# Patient Record
Sex: FEMALE | Race: BLACK OR AFRICAN AMERICAN | Employment: OTHER | URBAN - METROPOLITAN AREA
[De-identification: names, ages, dates, MRNs, and addresses within clinical notes are randomized per-mention and may not be internally consistent; named-entity substitution may affect disease eponyms.]

---

## 2019-05-31 ENCOUNTER — ED HISTORICAL/CONVERTED ENCOUNTER (OUTPATIENT)
Dept: OTHER | Age: 64
End: 2019-05-31

## 2020-01-21 ENCOUNTER — OFFICE VISIT (OUTPATIENT)
Dept: FAMILY MEDICINE CLINIC | Age: 65
End: 2020-01-21

## 2020-01-21 VITALS
BODY MASS INDEX: 30.32 KG/M2 | TEMPERATURE: 98.1 F | RESPIRATION RATE: 16 BRPM | SYSTOLIC BLOOD PRESSURE: 130 MMHG | HEIGHT: 65 IN | HEART RATE: 79 BPM | OXYGEN SATURATION: 97 % | DIASTOLIC BLOOD PRESSURE: 80 MMHG | WEIGHT: 182 LBS

## 2020-01-21 DIAGNOSIS — Z76.89 ENCOUNTER TO ESTABLISH CARE WITH NEW DOCTOR: Primary | ICD-10-CM

## 2020-01-21 DIAGNOSIS — R10.811 RIGHT UPPER QUADRANT ABDOMINAL TENDERNESS WITHOUT REBOUND TENDERNESS: ICD-10-CM

## 2020-01-21 DIAGNOSIS — R05.3 CHRONIC COUGH: ICD-10-CM

## 2020-01-21 DIAGNOSIS — E89.0 S/P TOTAL THYROIDECTOMY: ICD-10-CM

## 2020-01-21 DIAGNOSIS — I10 BENIGN ESSENTIAL HYPERTENSION: ICD-10-CM

## 2020-01-21 DIAGNOSIS — Z11.59 NEED FOR HEPATITIS C SCREENING TEST: ICD-10-CM

## 2020-01-21 DIAGNOSIS — E89.0 POSTOPERATIVE HYPOTHYROIDISM: ICD-10-CM

## 2020-01-21 DIAGNOSIS — R07.89 RIGHT-SIDED CHEST WALL PAIN: ICD-10-CM

## 2020-01-21 DIAGNOSIS — Z12.31 ENCOUNTER FOR SCREENING MAMMOGRAM FOR MALIGNANT NEOPLASM OF BREAST: ICD-10-CM

## 2020-01-21 DIAGNOSIS — Z12.11 SCREEN FOR COLON CANCER: ICD-10-CM

## 2020-01-21 PROBLEM — H91.91 COMPLETE HEARING LOSS OF RIGHT EAR: Status: ACTIVE | Noted: 2020-01-21

## 2020-01-21 RX ORDER — BISMUTH SUBSALICYLATE 262 MG
1 TABLET,CHEWABLE ORAL DAILY
COMMUNITY
End: 2020-02-12

## 2020-01-21 RX ORDER — TRIAMTERENE AND HYDROCHLOROTHIAZIDE 37.5; 25 MG/1; MG/1
CAPSULE ORAL
COMMUNITY
End: 2020-01-21 | Stop reason: SDUPTHER

## 2020-01-21 RX ORDER — LEVOTHYROXINE SODIUM 88 UG/1
88 TABLET ORAL
COMMUNITY
Start: 2020-01-11 | End: 2020-03-11

## 2020-01-21 RX ORDER — KETOROLAC TROMETHAMINE 10 MG/1
TABLET, FILM COATED ORAL
COMMUNITY
End: 2020-01-21

## 2020-01-21 RX ORDER — KETOROLAC TROMETHAMINE 10 MG/1
TABLET, FILM COATED ORAL
COMMUNITY
Start: 2020-01-04 | End: 2020-01-21

## 2020-01-21 RX ORDER — TRIAMTERENE/HYDROCHLOROTHIAZID 37.5-25 MG
1 CAPSULE ORAL DAILY
COMMUNITY
Start: 2019-12-16 | End: 2020-03-11 | Stop reason: ALTCHOICE

## 2020-01-21 NOTE — PATIENT INSTRUCTIONS
Medicare Wellness Visit, Female The best way to live healthy is to have a lifestyle where you eat a well-balanced diet, exercise regularly, limit alcohol use, and quit all forms of tobacco/nicotine, if applicable. Regular preventive services are another way to keep healthy. Preventive services (vaccines, screening tests, monitoring & exams) can help personalize your care plan, which helps you manage your own care. Screening tests can find health problems at the earliest stages, when they are easiest to treat. [facility] follows the current, evidence-based guidelines published by the [facility] ([facility]) when recommending preventive services for our patients. Because we follow these guidelines, sometimes recommendations change over time as research supports it. (For example, mammograms used to be recommended annually. Even though Medicare will still pay for an annual mammogram, the newer guidelines recommend a mammogram every two years for women of average risk). Of course, you and your doctor may decide to screen more often for some diseases, based on your risk and your co-morbidities (chronic disease you are already diagnosed with). Preventive services for you include: - Medicare offers their members a free annual wellness visit, which is time for you and your primary care provider to discuss and plan for your preventive service needs. Take advantage of this benefit every year! 
-All adults over the age of 72 should receive the recommended pneumonia vaccines. Current USPSTF guidelines recommend a series of two vaccines for the best pneumonia protection.  
-All adults should have a flu vaccine yearly and a tetanus vaccine every 10 years.  
-All adults age 48 and older should receive the shingles vaccines (series of two vaccines). -All adults age 38-68 who are overweight should have a diabetes screening test once every three years. -All adults born between [birth year] and 1966 should be screened once for Hepatitis C. 
-Other screening tests and preventive services for persons with diabetes include: an eye exam to screen for diabetic retinopathy, a kidney function test, a foot exam, and stricter control over your cholesterol.  
-Cardiovascular screening for adults with routine risk involves an electrocardiogram (ECG) at intervals determined by your doctor.  
-Colorectal cancer screenings should be done for adults age 54-65 with no increased risk factors for colorectal cancer. There are a number of acceptable methods of screening for this type of cancer. Each test has its own benefits and drawbacks. Discuss with your doctor what is most appropriate for you during your annual wellness visit. The different tests include: colonoscopy (considered the best screening method), a fecal occult blood test, a fecal DNA test, and sigmoidoscopy. 
 
-A bone mass density test is recommended when a woman turns 65 to screen for osteoporosis. This test is only recommended one time, as a screening. Some providers will use this same test as a disease monitoring tool if you already have osteoporosis. -Breast cancer screenings are recommended every other year for women of normal risk, age 54-69. 
-Cervical cancer screenings for women over age 72 are only recommended with certain risk factors. Here is a list of your current Health Maintenance items (your personalized list of preventive services) with a due date:

## 2020-01-21 NOTE — PROGRESS NOTES
Chief Complaint   Patient presents with    New Patient     welcome to medicare is due   [address]

## 2020-01-21 NOTE — PROGRESS NOTES
Chief Complaint   Patient presents with    New Patient    Establish Care    Thyroid Problem     follow up    Cough     x 3 weeks, soreness in right upper chest       HISTORY OF PRESENT ILLNESS   HPI  70 yo new patient presents to establish care. 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 She previously lived& worked in [place] for several years, then moved to [address] [place] [address] for this new work assignment which is projected to take up to about 2-3 yrs. She lives less than 5 miles from our practice and has chosen to establish care here. She had a routine check up w/ a primary care practice at [facility] back in . What prompted that was because she has a h/o thyroid disorder and needed to have her rx renewed. She was seeing her providers in [place] on a routine basis for preventive care, hypertension and thyroid mgt. She was diagnosed w/ nodular cystic goiter back in 2013 w/ benign biopsies but ended up having total thyroidectomy in 2015 for enlarging goiter and obstructive sx. She has been on TRT since that time, previously managed by an Endocrinologist then transitioned to her PCP. At her checkup at [facility] in  her Synthroid dose was increased from 75 mcg to 88 mcg due to high TSH. When she went back for follow up check there last week she was told that her TSH was now too low and it was recommended that she alternate between the 75 mcg and 88 mcg dose taking every other day. But she doesn't want to do that and would prefer to start over and have it rechecked here today first instead. Clinically she is stable. She reports 3 week h/o persistent annoying dry cough. Feels like slight tickling. Cough is not deep or coarse. Cough is non productive. No chest congestion. No fevers, chills, sweats. No hoarseness. No dysphagia. Mild acid reflux but not significant enough for her to feel she has needed to take anything for it.   No wt loss.  Lifelong nonsmoker. She has also had a 3 week h/o right upper chest wall pain/soreness and soreness right anterior lower rib margin. No pleuritic pain, sob, wheezing or hemoptysis. The discomfort extends up under her right rib cage but she denies abdominal pain, N/V/D/C or change in bowel habits. She went to [facility] 1-4-2020 and a Urine (negative) and KUB were obtained which I have reviewed in system as follows:  Study Date: Jan 4, 2020 2:06:07 PM EST Accession: [id number] Description: Abdomen Ref Phys: Liana MILNER HISTORY: abdominal and lower back pain (Hx) Comparison: None available. KUB: Findings: There is a normal bowel gas pattern with no bowel dilatation or small bowel gas present. There is no definite pathologic calcification. There is a moderate degree of colonic stool density seen throughout the colon. There is no gross free intraperitoneal air. However, the sensitivity of this finding is limited on this single supine view. The osseous structures are normal. IMPRESSION: Non-obstructive bowel gas pattern. Moderate stool. Comment: if patient has laboratory values or physical exam findings  concerning for an acute abdomen, a dedicated CT of the abdomen/pelviswith contrast would be recommended. She has family h/o breast cancer as detailed below. She gets mammograms annually. Due screening at this time. No breast complaints. Denies breast pain. Does SBE's and denies lumps or masses or tenderness. She had a benign cyst removed from her right breast in the 1980's and denies abnormal mammograms. She has chronic hypertension diagnosed in her 29's and states she has been on the Dyazide ever since then w/ good control x several years. REVIEW OF SYMPTOMS   Review of Systems   Constitutional: Negative. Negative for chills, diaphoresis, fever, malaise/fatigue and weight loss.    Respiratory: Negative for hemoptysis, sputum production, shortness of breath and wheezing. Cardiovascular: Negative for palpitations and leg swelling. Gastrointestinal: Negative for nausea and vomiting. Genitourinary: Negative. Neurological: Negative. Endo/Heme/Allergies: Negative. Psychiatric/Behavioral: Negative. PROBLEM LIST/MEDICAL HISTORY     Problem List  Date Reviewed: 2020          Codes Class Noted    S/P total thyroidectomy ICD-10-CM: E89.0  ICD-9-CM: V45.89  2020    Overview Signed 2020 11:45 AM by Angelic Marina MD     Surgery 2015 for Multinodular Goiter, Cystic masses, Benign             Benign essential hypertension ICD-10-CM: I10  ICD-9-CM: 401.1  2020    Overview Signed 2020 11:47 AM by Angelic Marina MD     Diagnosed in her 30's             Complete hearing loss of right ear ICD-10-CM: H91.91  ICD-9-CM: 389.9  2020    Overview Signed 2020 11:51 AM by Angelic Marina MD     [id number], s/p trauma w/ resultant surgery ending in complete hearing loss             Postoperative hypothyroidism ICD-10-CM: E89.0  ICD-9-CM: 244.0  2020    Overview Signed 2020 12:21 PM by Angelic Marina MD     S/p total thyroidectomy 2015 for nodular/cystic goiter, benign                       PAST SURGICAL HISTORY     Past Surgical History:   Procedure Laterality Date    HX BREAST BIOPSY Right     Benign Cyst Removed    HX  SECTION  ,     HX COLONOSCOPY  2015    Normal done in [place], repeat in 5 yrs due to family hx    HX CYST REMOVAL      Skin on back of neck    HX HEENT      Right Ear Surgery, Traumatic Scars    HX PARTIAL HYSTERECTOMY  in her 42's    Uterine Fibroids, Left Ovarian Cysts-Left Oopherectomy    HX THYROIDECTOMY  2015    Cystic Goiter    IR FINE NEEDLE ASPIRATION THYROID  2013    Benign Cysts         MEDICATIONS     Current Outpatient Medications   Medication Sig    SYNTHROID 88 mcg tablet Take 88 mcg by mouth Daily (before breakfast).     DYAZIDE 37.5-25 mg per capsule Take 1 Cap by mouth daily.  multivitamin (ONE A DAY) tablet Take 1 Tab by mouth daily. No current facility-administered medications for this visit. ALLERGIES     Allergies   Allergen Reactions    Sulfa (Sulfonamide Antibiotics) Swelling     Facial swelling           SOCIAL HISTORY     Social History     Socioeconomic History    Marital status:      Spouse name: Not on file    Number of children: 2    Years of education: Not on file    Highest education level: Not on file   Occupational History    Occupation: Contractor/ for the [employer]   Tobacco Use    Smoking status: Never Smoker    Smokeless tobacco: Never Used   Substance and Sexual Activity    Alcohol use: Yes     Comment: occsaional glass of wine    Drug use: Never    Sexual activity: Yes   Social History Narrative    Native of [place] [place] [facility] where all of her family still resides. 1 son in [place] [facility], but 1 daughter in [address] [place] [address]. Patient is a software contractor, lived/worked in [place] for several years, moved to [address] [place] [address] for work related 2-3 yr project. Lives in [place] near [place].          IMMUNIZATIONS  Immunization History   Administered Date(s) Administered    Tdap 10/30/2019         FAMILY HISTORY     Family History   Problem Relation Age of Onset    Lung Cancer Mother          age 68, former smoker    Breast Cancer Mother         diagnosed w/ breast cancer in her late 50's/early 63's, surgery, survivor     Hypertension Mother     Prostate Cancer Father          in his early [de-identified] Hypertension Sister     Diabetes Sister     Breast Cancer Sister         diagnosed in her early 42's, treated w/ radiation and surgery    Prostate Cancer Brother         survivor    Colon Cancer Brother         diagnosed in his 52's    Asthma Son     No Known Problems Daughter     Hypertension Sister     High Cholesterol Sister  Hypertension Brother     Obesity Brother     No Known Problems Brother     Heart Disease Brother          VITALS     Visit Vitals  /80 (BP 1 Location: Left arm, BP Patient Position: Sitting)   Pulse 79   Temp 98.1 °F (36.7 °C) (Oral)   Resp 16   Ht 5' 5.35\" (1.66 m)   Wt 182 lb (82.6 kg)   SpO2 97%   BMI 29.96 kg/m²          PHYSICAL EXAMINATION   Physical Exam  Vitals signs reviewed. Constitutional:       General: She is not in acute distress. Appearance: Normal appearance. She is not ill-appearing. Comments: Healthy appearing AA female   HENT:      Mouth/Throat:      Mouth: Mucous membranes are moist.      Pharynx: Oropharynx is clear. No pharyngeal swelling, oropharyngeal exudate or uvula swelling. Neck:      Musculoskeletal: No muscular tenderness. Cardiovascular:      Rate and Rhythm: Normal rate and regular rhythm. Heart sounds: Normal heart sounds. No murmur. No gallop. Pulmonary:      Effort: Pulmonary effort is normal.      Breath sounds: Normal breath sounds. Chest:      Chest wall: Tenderness (reproducible tenderness right upper chest wall muscles and intercostal muscles; mild tenderness along right anterior lower rib margin) present. Abdominal:      General: Bowel sounds are normal.      Palpations: Abdomen is soft. There is no mass. Tenderness: There is no guarding or rebound. Negative signs include Holloway's sign. Comments: Mild tenderness directly behind right lower rib margin in RUQ. No palpable masses, no G/R. Musculoskeletal:         General: No swelling or tenderness. Right lower leg: No edema. Left lower leg: No edema. Skin:     General: Skin is warm and dry. Psychiatric:         Mood and Affect: Mood normal.              ASSESSMENT & PLAN       ICD-10-CM ICD-9-CM    1. Encounter to establish care with new doctor Z76.89 V65.8    2. Postoperative hypothyroidism E89.0 244.0 TSH 3RD GENERATION      T4, FREE      T3 TOTAL   3.  S/P total thyroidectomy E89.0 V45.89    4. Benign essential hypertension, controlled, stable on current regimen of Dyazide x several years I10 401.1 CBC W/O DIFF      METABOLIC PANEL, COMPREHENSIVE   5. Chronic cough R05 786.2 XR RIBS RT W PA CXR MIN 3 V   6. Right-sided chest wall pain R07.89 786.52 XR RIBS RT W PA CXR MIN 3 V   7. Right upper quadrant abdominal tenderness without rebound tenderness R10.811 789.61    8. Need for hepatitis C screening test Z11.59 V73.89 HEPATITIS C AB   9. Encounter for screening mammogram for malignant neoplasm of breast Z12.31 V76.12 RENÉE 3D ALETHA W MAMMO BI SCREENING INCL CAD   8. Screen for colon cancer Z12.11 V76.51 REFERRAL FOR COLONOSCOPY     72 yo new patient presents to establish care. 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 .  She lives less than 5 miles from our practice and has chosen to establish care here. Thyroid:  S/P total thyroidectomy 2015 for nodular/cystic goiter  She had a routine check up w/ a primary care practice at [facility] for the first time back in . At her checkup at [facility] in  her Synthroid dose was increased from 75 mcg to 88 mcg due to high TSH. When she went back for follow up check there last week she was told that her TSH was now too low and it was recommended that she alternate between the 75 mcg and 88 mcg dose taking every other day. But she doesn't want to do that and would prefer to start over and have it rechecked here today first instead. Clinically she is stable. I have no records available for review at this time. TFT's rechecked today. Cough/Chest:  Chronic dry cough x 3 weeks. Differential dx r/w pt including allergic, pnd, acid reflux, obstructive, other. Chest wall pain/tenderness and rib tenderness may be related to muscle strain from coughing, inflammation, costochondritis. Check rib films and chest xray.   Will consider trial of allergy/sinus pill and or PPI after review  She has mild RUQ tenderness on exam which may also be muscular related to the above. I noticed in EMR that she was seen at [facility] on 1-4-2020 w/ c/o abdominal pain although she denies abdominal pain at this time. Urine done there that day was negative. KUB showed large stool, otherwise negative. If workup today negative and she continues w/ the above complaints of right chest/rib tenderness, would consider RUQ US to rule out gallbladder colic as well. She is due mammogram screen at this time, so will go ahead and get that ordered now as well. Reviewed diet, nutrition, exercise, weight management, BMI/goals, age/risk based screening recommendations, health maintenance & prevention counseling. Cancer screening USPTFS guidelines reviewed w/ pt today. Discussed benefits/positive/negative outcomes of screening based on age/risk stratification. Informed consent for/against screening based on pt's personal hx/risk factors. Updated in history above and health maintenance. Mammogram ordered. Referred for colonoscopy. No longer gets paps due to hysterectomy years ago for benign disease.     Further follow up & other recommendations pending review of labs, xrays and mammogram. Otherwise plan on follow up in 4-6 weeks, sooner if anything worsens in the interim

## 2020-01-22 LAB
ALBUMIN SERPL-MCNC: 4.7 G/DL (ref 3.8–4.8)
ALBUMIN/GLOB SERPL: 1.6 {RATIO} (ref 1.2–2.2)
ALP SERPL-CCNC: 84 IU/L (ref 39–117)
ALT SERPL-CCNC: 17 IU/L (ref 0–32)
AST SERPL-CCNC: 15 IU/L (ref 0–40)
BILIRUB SERPL-MCNC: 0.3 MG/DL (ref 0–1.2)
BUN SERPL-MCNC: 15 MG/DL (ref 8–27)
BUN/CREAT SERPL: 16 (ref 12–28)
CALCIUM SERPL-MCNC: 10.4 MG/DL (ref 8.7–10.3)
CHLORIDE SERPL-SCNC: 98 MMOL/L (ref 96–106)
CO2 SERPL-SCNC: 26 MMOL/L (ref 20–29)
CREAT SERPL-MCNC: 0.95 MG/DL (ref 0.57–1)
ERYTHROCYTE [DISTWIDTH] IN BLOOD BY AUTOMATED COUNT: 14.1 % (ref 11.7–15.4)
GLOBULIN SER CALC-MCNC: 2.9 G/DL (ref 1.5–4.5)
GLUCOSE SERPL-MCNC: 88 MG/DL (ref 65–99)
HCT VFR BLD AUTO: 40.8 % (ref 34–46.6)
HCV AB S/CO SERPL IA: <0.1 S/CO RATIO (ref 0–0.9)
HGB BLD-MCNC: 13.2 G/DL (ref 11.1–15.9)
MCH RBC QN AUTO: 24.5 PG (ref 26.6–33)
MCHC RBC AUTO-ENTMCNC: 32.4 G/DL (ref 31.5–35.7)
MCV RBC AUTO: 76 FL (ref 79–97)
PLATELET # BLD AUTO: 432 X10E3/UL (ref 150–450)
POTASSIUM SERPL-SCNC: 3.9 MMOL/L (ref 3.5–5.2)
PROT SERPL-MCNC: 7.6 G/DL (ref 6–8.5)
RBC # BLD AUTO: 5.38 X10E6/UL (ref 3.77–5.28)
SODIUM SERPL-SCNC: 142 MMOL/L (ref 134–144)
T3 SERPL-MCNC: 95 NG/DL (ref 71–180)
T4 FREE SERPL-MCNC: 1.74 NG/DL (ref 0.82–1.77)
TSH SERPL DL<=0.005 MIU/L-ACNC: 0.02 UIU/ML (ref 0.45–4.5)
WBC # BLD AUTO: 9 X10E3/UL (ref 3.4–10.8)

## 2020-01-25 ENCOUNTER — TELEPHONE (OUTPATIENT)
Dept: FAMILY MEDICINE CLINIC | Age: 65
End: 2020-01-25

## 2020-01-25 DIAGNOSIS — E83.52 HYPERCALCEMIA: ICD-10-CM

## 2020-01-25 DIAGNOSIS — E89.0 POSTOPERATIVE HYPOTHYROIDISM: Primary | ICD-10-CM

## 2020-01-25 NOTE — TELEPHONE ENCOUNTER
Advise pt her kidney and liver tests were normal and her one time Hep C screen recommended for all baby boomers was negative. Glucose/blood sugar was normal, non diabetes range. Calcium level was mildly elevated. I dont have a baseline for her, so I am not sure if this is something she has a history of and if it has been worked up before. Sometimes it can be related to her BP medication. Ask her if she has h/o this. Taking any calcium or calcium containing supplements? Thyroid is on the OVER active side as suggested by her LOW TSH (it is the opposite concept). She told me that at her checkup w/ previous PCP at [address] [place] [address] in  her Synthroid dose was increased from 75 mcg to 88 mcg due to high TSH. When she went back for follow up check there about 2 weeks ago she was told that her TSH was now too low (like it is still now) and it was recommended that she alternate between the 75 mcg and 88 mcg dose taking every other day. But she didnt want to do that yet and preferred to wait to have it rechecked here first instead. So now that recommendation of lowering her dose is going to be the same. If she doesn't like the concept of having 2 different scripts and having to alternate back and forth, I can make another suggestion for reduced dosing for her to try instead. Let me know which she prefers and I will send recommendations for new regimen instead. She is already scheduled as noted below for follow up in March w/ me to follow up on multiple issues.       Future Appointments  Date Time Provider Ishan Aranda  3/3/2020  8:40 AM [facility]

## 2020-01-27 NOTE — TELEPHONE ENCOUNTER
Went over results with pt. She doesn't recall anyone telling her that her CA+ was elevated. She takes a multivitamin but no CA+ supplement. I told her to stop the multivit. Asked her about the thyroid dose. She said that she has been taking 88 mcg dose since Oct.  She said that the MD @ [facility] wanted her to alternate but she didn't want to do 2 pills. Told her I would get back with her    Alyssa Titus over new directions for the synthroid. She states understanding and repeated back to me. She will come the week before her appt to get labs done.

## 2020-01-30 NOTE — TELEPHONE ENCOUNTER
1) Stop the MVI    2) Since she wants to stick w/ one script instead of alternating between the two and given that she already has the 88 mcg dose, we will work w/ that. We have to find a regimen that will be more than 75 mcg per day and less than 88 mcg per day. So her new regimen will be:    Synthroid 88 mcg tabs:  -Take 1 & 1/2 tabs once a day on Mon-Thur  -Take 1/2 tab on Fri  -Skip on Sat & Sun    She is scheduled for follow up w/ me 3/3. Will recheck labs then but I went ahead and pre-ordered now.  She does not need to fast.

## 2020-02-03 ENCOUNTER — HOSPITAL ENCOUNTER (OUTPATIENT)
Dept: MAMMOGRAPHY | Age: 65
Discharge: HOME | End: 2020-02-03
Attending: FAMILY MEDICINE
Payer: MEDICARE

## 2020-02-03 ENCOUNTER — HOSPITAL ENCOUNTER (OUTPATIENT)
Dept: GENERAL RADIOLOGY | Age: 65
Discharge: HOME | End: 2020-02-03
Attending: FAMILY MEDICINE
Payer: MEDICARE

## 2020-02-03 DIAGNOSIS — Z12.31 ENCOUNTER FOR SCREENING MAMMOGRAM FOR MALIGNANT NEOPLASM OF BREAST: ICD-10-CM

## 2020-02-03 DIAGNOSIS — R07.89 RIGHT-SIDED CHEST WALL PAIN: ICD-10-CM

## 2020-02-03 DIAGNOSIS — R05.3 CHRONIC COUGH: ICD-10-CM

## 2020-02-03 PROCEDURE — 71101 X-RAY EXAM UNILAT RIBS/CHEST: CPT

## 2020-02-03 PROCEDURE — 77063 BREAST TOMOSYNTHESIS BI: CPT

## 2020-02-07 ENCOUNTER — TELEPHONE (OUTPATIENT)
Dept: FAMILY MEDICINE CLINIC | Age: 65
End: 2020-02-07

## 2020-02-07 NOTE — TELEPHONE ENCOUNTER
Advise pt rib and chest film shows small right pleural effusion (small fluid in base of lung) and small area of compressed lung (slight collapse). Could represent resolving or present infection or other cause. Rib was normal.  How is she doing? Symptoms better, worse, same or resolved?   Still waiting on her mammogram.

## 2020-02-10 NOTE — TELEPHONE ENCOUNTER
Pt said that she isn't doing any better. She is still in pain and still coughing.   I scheduled her for an appt for Wed 2/12

## 2020-02-12 ENCOUNTER — OFFICE VISIT (OUTPATIENT)
Dept: FAMILY MEDICINE CLINIC | Age: 65
End: 2020-02-12

## 2020-02-12 VITALS
OXYGEN SATURATION: 99 % | SYSTOLIC BLOOD PRESSURE: 138 MMHG | HEIGHT: 65 IN | WEIGHT: 183.4 LBS | TEMPERATURE: 98.4 F | DIASTOLIC BLOOD PRESSURE: 86 MMHG | HEART RATE: 75 BPM | BODY MASS INDEX: 30.56 KG/M2 | RESPIRATION RATE: 16 BRPM

## 2020-02-12 DIAGNOSIS — R05.8 ALLERGIC COUGH: ICD-10-CM

## 2020-02-12 DIAGNOSIS — R05.3 CHRONIC COUGH: Primary | ICD-10-CM

## 2020-02-12 DIAGNOSIS — E89.0 S/P TOTAL THYROIDECTOMY: ICD-10-CM

## 2020-02-12 DIAGNOSIS — R07.9 RIGHT-SIDED CHEST PAIN: ICD-10-CM

## 2020-02-12 DIAGNOSIS — R22.1 LOCALIZED SWELLING, MASS OR LUMP OF NECK: ICD-10-CM

## 2020-02-12 DIAGNOSIS — Z86.39 HISTORY OF MULTINODULAR GOITER: ICD-10-CM

## 2020-02-12 DIAGNOSIS — R10.811 RIGHT UPPER QUADRANT ABDOMINAL TENDERNESS WITHOUT REBOUND TENDERNESS: ICD-10-CM

## 2020-02-12 RX ORDER — CETIRIZINE HCL 10 MG
10 TABLET ORAL
COMMUNITY
Start: 2020-02-12 | End: 2020-03-11

## 2020-02-12 RX ORDER — BUDESONIDE AND FORMOTEROL FUMARATE DIHYDRATE 80; 4.5 UG/1; UG/1
2 AEROSOL RESPIRATORY (INHALATION) 2 TIMES DAILY
Qty: 1 INHALER | Refills: 0 | Status: SHIPPED | OUTPATIENT
Start: 2020-02-12 | End: 2020-02-24 | Stop reason: SINTOL

## 2020-02-12 NOTE — PROGRESS NOTES
Chief Complaint   Patient presents with    Cough     getting worse and is dry              1. Have you been to the ER, urgent care clinic since your last visit? Hospitalized since your last visit? No    2. Have you seen or consulted any other health care providers outside of the [address] [place] [address] since your last visit? Include any pap smears or colon screening.  No

## 2020-02-12 NOTE — PATIENT INSTRUCTIONS
Chronic Cough: Care Instructions Your Care Instructions A cough is your body's response to something that bothers your throat or airways. Many things can cause a cough. You might cough because of a cold or the flu, bronchitis, or asthma. Smoking, postnasal drip, allergies, and stomach acid that backs up into your throat also can cause a cough. A cough can be short-term (acute) or long-term (chronic). A chronic cough lasts more than 3 weeks. A chronic cough is often caused by a long-term problem, such as asthma. Another cause might be a medicine, such as an ACE inhibitor. A cough is a symptom, not a disease. To treat a chronic cough, you may need to treat the problem that causes it. You can take a few steps at home to cough less and feel better. Some people cough or clear their throat out of habit for no clear reason. Follow-up care is a key part of your treatment and safety. Be sure to make and go to all appointments, and call your doctor if you are having problems. It's also a good idea to know your test results and keep a list of the medicines you take. How can you care for yourself at home? · Drink plenty of water and other fluids. This may help soothe a dry or sore throat. Honey or lemon juice in hot water or tea may ease a dry cough. · Prop up your head on pillows to help you breathe and ease a cough. · Do not smoke or allow others to smoke around you. Smoke can make a cough worse. If you need help quitting, talk to your doctor about stop-smoking programs and medicines. These can increase your chances of quitting for good. · Avoid exposure to smoke, dust, or other pollutants, or wear a face mask. Check with your doctor or pharmacist to find out which type of face mask will give you the most benefit. · Take cough medicine as directed by your doctor. · Try cough drops to soothe a dry or sore throat. Cough drops don't stop a cough.  Medicine-flavored cough drops are no better than candy-flavored drops or hard candy. Throat clearing When you have a chronic cough or a disease that may cause this type of cough, you may often feel like you want to clear your throat. This helps bring up mucus. But throat clearing does not always have a cause. Throat clearing can become a habit. The more you do it, the more you feel like you need to do it. But frequent throat clearing can be hard on your vocal cords. It's like slamming them together. To help lessen throat clearing, you can try: · Taking small sips of water. · Not clearing your throat when you feel you need to. · Swallowing hard when you want to clear your throat. You may want to ask your doctor if a medicine that thins mucus would help. When should you call for help? Call 911 anytime you think you may need emergency care. For example, call if: 
  · You have severe trouble breathing.  
 Call your doctor now or seek immediate medical care if: 
  · You cough up blood.  
  · You have new or worse trouble breathing.  
  · You have a new or higher fever.  
 Watch closely for changes in your health, and be sure to contact your doctor if: 
  · You cough more deeply or more often, especially if you notice more mucus or a change in the color of your mucus.  
  · You do not get better as expected. Where can you learn more? Go to http://evette-diane.info/. Enter X144 in the search box to learn more about \"Chronic Cough: Care Instructions. \" Current as of: June 9, 2019 Content Version: 12.2 © 7724-5911 [employer]. Care instructions adapted under license by [employer] (which disclaims liability or warranty for this information). If you have questions about a medical condition or this instruction, always ask your healthcare professional. [address] any warranty or liability for your use of this information.

## 2020-02-18 ENCOUNTER — TELEPHONE (OUTPATIENT)
Dept: FAMILY MEDICINE CLINIC | Age: 65
End: 2020-02-18

## 2020-02-19 NOTE — TELEPHONE ENCOUNTER
Advise pt we did received updated report now on her mammogram which was negative/normal. Waiting on her neck/thyroid US now. Order is in system but doesn't look like it is scheduled yet. Give her central scheduling number and advise her to call to set that up.

## 2020-02-24 RX ORDER — MONTELUKAST SODIUM 10 MG/1
10 TABLET ORAL
Qty: 30 TAB | Refills: 1 | Status: SHIPPED | OUTPATIENT
Start: 2020-02-24 | End: 2020-03-11

## 2020-03-05 ENCOUNTER — TELEPHONE (OUTPATIENT)
Dept: FAMILY MEDICINE CLINIC | Age: 65
End: 2020-03-05

## 2020-03-06 NOTE — TELEPHONE ENCOUNTER
Documentation on telephone conversation from yesterday 3-4-2020. I received call from Dr. Christopher Pollard from ER in [place]. He advised that patient presented there w/ chest pain and increased right pleuritic pain. Her CXR revealed a pleural effusion so he did a bedside Echo which did not show any ventricular dysfunction, CHF or pericardial effusions. He was suspicious for PE and did a Chest CTA. It was negative fro PE but revealed multiple pulmonary nodules and mediastinal LAD concerning for metastatic disease. He advised pt to follow up w/ me for outpatient evaluation/referral once she gets back to [place]. I asked that he fax ER records, labs, xrays/all imaging reports and gave him my direct fax number. Awaiting those reports. He will have pt call to schedule appt w/ me asap upon return.

## 2020-03-10 ENCOUNTER — HOSPITAL ENCOUNTER (OUTPATIENT)
Dept: ULTRASOUND IMAGING | Age: 65
Discharge: HOME | End: 2020-03-10
Attending: FAMILY MEDICINE
Payer: MEDICARE

## 2020-03-10 DIAGNOSIS — R22.1 LOCALIZED SWELLING, MASS OR LUMP OF NECK: ICD-10-CM

## 2020-03-10 PROCEDURE — 76536 US EXAM OF HEAD AND NECK: CPT

## 2020-03-11 ENCOUNTER — OFFICE VISIT (OUTPATIENT)
Dept: FAMILY MEDICINE CLINIC | Age: 65
End: 2020-03-11

## 2020-03-11 VITALS
OXYGEN SATURATION: 97 % | HEART RATE: 79 BPM | WEIGHT: 182.4 LBS | SYSTOLIC BLOOD PRESSURE: 140 MMHG | DIASTOLIC BLOOD PRESSURE: 82 MMHG | HEIGHT: 65 IN | TEMPERATURE: 97.9 F | RESPIRATION RATE: 16 BRPM | BODY MASS INDEX: 30.39 KG/M2

## 2020-03-11 DIAGNOSIS — E83.52 HYPERCALCEMIA: ICD-10-CM

## 2020-03-11 DIAGNOSIS — R91.8 MULTIPLE PULMONARY NODULES: Primary | ICD-10-CM

## 2020-03-11 DIAGNOSIS — E89.0 POSTOPERATIVE HYPOTHYROIDISM: ICD-10-CM

## 2020-03-11 DIAGNOSIS — I10 BENIGN ESSENTIAL HYPERTENSION: ICD-10-CM

## 2020-03-11 DIAGNOSIS — R60.9 FLUID RETENTION: ICD-10-CM

## 2020-03-11 DIAGNOSIS — R22.1 MASS OF RIGHT SIDE OF NECK: ICD-10-CM

## 2020-03-11 DIAGNOSIS — J90 MODERATE SIZED PLEURAL EFFUSION: ICD-10-CM

## 2020-03-11 DIAGNOSIS — E89.0 S/P TOTAL THYROIDECTOMY: ICD-10-CM

## 2020-03-11 DIAGNOSIS — R91.1 RIGHT LOWER LOBE PULMONARY NODULE: ICD-10-CM

## 2020-03-11 DIAGNOSIS — R59.0 HILAR ADENOPATHY: ICD-10-CM

## 2020-03-11 DIAGNOSIS — R59.0 MEDIASTINAL ADENOPATHY: ICD-10-CM

## 2020-03-11 DIAGNOSIS — J90 PLEURAL EFFUSION ON RIGHT: ICD-10-CM

## 2020-03-11 RX ORDER — SPIRONOLACTONE 50 MG/1
50 TABLET, FILM COATED ORAL 2 TIMES DAILY
Qty: 60 TAB | Refills: 1 | Status: ON HOLD | OUTPATIENT
Start: 2020-03-11 | End: 2020-04-20

## 2020-03-11 RX ORDER — LEVOTHYROXINE SODIUM 88 UG/1
88 TABLET ORAL
COMMUNITY
Start: 2020-03-11 | End: 2020-06-23 | Stop reason: SDUPTHER
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
## 2020-03-11 NOTE — PATIENT INSTRUCTIONS
Learning About Lung Nodules What is a lung nodule? A lung nodule is a growth in the lung. A single nodule surrounded by lung tissue is called a solitary pulmonary nodule. A lung nodule might not cause any symptoms. Your doctor may have found one or more nodules on your lung when you were having a chest X-ray or CT scan. Or it may have been found during a lung cancer screening. A lung nodule may be caused by an old infection or cancer. It might also be a noncancerous growth. Lung nodules can cause a screening to give an abnormal result. Most nodules do not cause any harm. But without further tests, your doctor can't tell whether an abnormal finding is cancer, a harmless nodule, or something else. What can you expect when you have a lung nodule? Your doctor will look at several risk factors to see how likely it is that the nodule is cancer. He or she will look at: · Whether you smoke or have ever smoked. · Your age and your family's medical history. · Whether you have ever had lung cancer. · The size and shape of the nodule. · Whether the nodule has changed in size. Your doctor may look at past chest X-rays or CT scans, if available, and compare them. Or you may have a series of CT scans to see if the nodule grows over time. What happens next depends on the risk of the nodule being cancer. · If you have no risk factors and the nodule is small, your doctor may advise doing nothing. · If the risk is small, your doctor may schedule follow-up appointments and tests. You may have more CT scans later to see if the nodule is growing. If the nodule hasn't changed in 3 to 6 months, you may have CT scans every year. If it hasn't changed in 2 years, you may not need any more tests. · If there's a higher risk of cancer, your doctor may: ? Do a PET scan, which may help tell if the nodule is cancerous or not. ? Take a sample of tissue from the nodule for testing. This is called a biopsy. ? Remove the nodule with surgery. Follow-up care is a key part of your treatment and safety. Be sure to make and go to all appointments, and call your doctor if you are having problems. It's also a good idea to know your test results and keep a list of the medicines you take. Where can you learn more? Go to http://evette-diane.info/. Enter T503 in the search box to learn more about \"Learning About Lung Nodules. \" Current as of: December 19, 2018 Content Version: 12.2 © 1156-3250 [employer]. Care instructions adapted under license by [employer] (which disclaims liability or warranty for this information). If you have questions about a medical condition or this instruction, always ask your healthcare professional. [address] any warranty or liability for your use of this information.

## 2020-03-11 NOTE — PROGRESS NOTES
Chief Complaint   Patient presents with   [facility]     ER visit while in [facility]     1. Have you been to the ER, urgent care clinic since your last visit? Hospitalized since your last visit? Yes Where: was seen at urgent care a couple of days before ER visit    2. Have you seen or consulted any other health care providers outside of the [address] [place] [address] since your last visit? Include any pap smears or colon screening.  No

## 2020-03-12 ENCOUNTER — HOSPITAL ENCOUNTER (OUTPATIENT)
Dept: CT IMAGING | Age: 65
Discharge: HOME | End: 2020-03-12
Attending: FAMILY MEDICINE
Payer: MEDICARE

## 2020-03-12 DIAGNOSIS — R22.1 MASS OF RIGHT SIDE OF NECK: ICD-10-CM

## 2020-03-12 PROCEDURE — 74011000258 HC RX REV CODE- 258: Performed by: RADIOLOGY

## 2020-03-12 PROCEDURE — 74011636320 HC RX REV CODE- 636/320: Performed by: RADIOLOGY

## 2020-03-12 PROCEDURE — 70491 CT SOFT TISSUE NECK W/DYE: CPT

## 2020-03-12 RX ORDER — SODIUM CHLORIDE 0.9 % (FLUSH) 0.9 %
10 SYRINGE (ML) INJECTION
Status: COMPLETED | OUTPATIENT
Start: 2020-03-12 | End: 2020-03-12

## 2020-03-12 RX ADMIN — SODIUM CHLORIDE 100 ML: 900 INJECTION, SOLUTION INTRAVENOUS at 08:13

## 2020-03-12 RX ADMIN — Medication 10 ML: at 08:03

## 2020-03-12 RX ADMIN — IOPAMIDOL 100 ML: 612 INJECTION, SOLUTION INTRAVENOUS at 08:03

## 2020-03-13 ENCOUNTER — TELEPHONE (OUTPATIENT)
Dept: FAMILY MEDICINE CLINIC | Age: 65
End: 2020-03-13

## 2020-03-13 ENCOUNTER — HOSPITAL ENCOUNTER (OUTPATIENT)
Dept: CT IMAGING | Age: 65
Discharge: HOME | End: 2020-03-13
Attending: FAMILY MEDICINE
Payer: MEDICARE

## 2020-03-13 DIAGNOSIS — C79.9 METASTATIC DISEASE (HCC): Primary | ICD-10-CM

## 2020-03-13 DIAGNOSIS — C79.9 METASTATIC DISEASE (HCC): ICD-10-CM

## 2020-03-13 PROCEDURE — 74177 CT ABD & PELVIS W/CONTRAST: CPT

## 2020-03-13 PROCEDURE — 74011000258 HC RX REV CODE- 258: Performed by: RADIOLOGY

## 2020-03-13 PROCEDURE — 74011636320 HC RX REV CODE- 636/320: Performed by: RADIOLOGY

## 2020-03-13 RX ORDER — SODIUM CHLORIDE 0.9 % (FLUSH) 0.9 %
10 SYRINGE (ML) INJECTION
Status: COMPLETED | OUTPATIENT
Start: 2020-03-13 | End: 2020-03-13

## 2020-03-13 RX ADMIN — IOHEXOL 50 ML: 240 INJECTION, SOLUTION INTRATHECAL; INTRAVASCULAR; INTRAVENOUS; ORAL at 15:43

## 2020-03-13 RX ADMIN — Medication 10 ML: at 15:43

## 2020-03-13 RX ADMIN — IOPAMIDOL 100 ML: 755 INJECTION, SOLUTION INTRAVENOUS at 15:43

## 2020-03-13 RX ADMIN — SODIUM CHLORIDE 100 ML: 900 INJECTION, SOLUTION INTRAVENOUS at 15:43

## 2020-03-13 NOTE — TELEPHONE ENCOUNTER
PI of results. Told her that it would be ordered as stat and should be in touch with her today to schedule,    Stat order faxed.

## 2020-03-13 NOTE — TELEPHONE ENCOUNTER
Advise pt radiologist reports that her neck ct shows that the mass in question appears to be what is referred to as a necrotic node along w/ some other lymph nodes around her trachea and chest which is also concerning for possible metastasis. The radiologist is recommending we do a CT scan of her abdomen and pelvis to see if we can locate a primary source for where this metastasis could be originating. We can try to get this scheduled before her appt w/ the thoracic surgeon she has on 3-17-20 (see below). They will be able to access the Neck CT as well as the CT of Abd/Pelvis if we can get this set up before hand. If not, we can still work on getting this set up for next week even it if is after she sees them.      Future Appointments  Date Time Provider Ishan Aranda  3/17/2020  2:00 PM Vero Borrego MD [email or web address]

## 2020-03-14 ENCOUNTER — TELEPHONE (OUTPATIENT)
Dept: FAMILY MEDICINE CLINIC | Age: 65
End: 2020-03-14

## 2020-03-14 DIAGNOSIS — C79.9 METASTATIC NEOPLASTIC DISEASE (HCC): Primary | ICD-10-CM

## 2020-03-14 DIAGNOSIS — N83.8 OVARIAN MASS, RIGHT: ICD-10-CM

## 2020-03-14 DIAGNOSIS — R93.5 ABNORMAL CT SCAN, PELVIS: ICD-10-CM

## 2020-03-14 NOTE — TELEPHONE ENCOUNTER
Good afternoon Dr. 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 This patient is scheduled to see you this coming week on 3-. She presented to me as a new patient less than 2 months ago to get established w/ me as her new PCP after moving here from [place] for work. I had no prior records and she had multiple complaints at the time of her first visit. I started w/ workup of various different things at once. My ultimate plan also included getting a chest ct scan on her but she ended up in the ER in [place] for chest pain and ROGER. A Chest CTA was done 3-4-2020 (see report at end of this note) and PE was ruled out but findings of multiple pulmonary nodules w/ the largest being within the superior segment of the right lower lobe measuring 23 mm posteriorly as well as moderate right pleural effusion. Apparently these images are not included in PACS, so I was able to pull up the radiologist's dictation in EMR and copied that below in case you cannot locate it. I also asked pt to bring a copy of the CD to her appt w/ you if possible. Hopefully this was made possible for your review but I wanted to give you a heads up in case you are able to locate those images in your system prior to her appt . Also, in the interim I had performed some neck imaging because of her thyroid hx and a notable neck mass on exam. US revealed a heterogeneous mass lesion noted in the right neck concerning for neoplasm. Subsequent Neck CT revealed a necrotic right level 4/supraclavicular tariq mass measuring 3.2 x 2.4 x 3.1  cm, which surrounds and severely narrows the right internal jugular vein  focally. Additional necrotic right paratracheal and subcarinal lymph nodes in the mediastinum. Findings consistent with necrotic tariq  Metastases. Dr. Oliva Hemphill then recommended additional CT imaging of abdomen and pelvis but the chest was not done.  The Abd/Pelvis CT (which IS in PACS) showed multiple findings concerning for metastatic disease in various areas. The radiologist's recommendation is to obtain Renal Protocol CT and a Pelvic US. We will be contacting pt to get the Pelvic US done to rule out ovarian neoplasm. I am holding on the renal protocol ct and additional chest ct until she sees you and to get your input. I plan to go ahead and get her in w/ Oncology before submitting her through more testing until she sees them. So far it seems to be consistent w/ a lung cancer but I am hoping after you see her you can determine if lung biopsy for tissue diagnosis will be helpful prior to her Oncology appt. Sorry for so much, but this was all fairly rapidly evolving w/ her being so new to me and her traveling back and forth from here to [place]. Wanted to give you an overview prior to her appt w/ you this week. Look fwd to hearing back from you and getting your input. I included the Chest CTA report below in case you couldn't locate that, but all her other images are in PACS. We are trying to get CD of her actual images from [place]. Thanks for your help.     ---------------------------------------------------  Patient obtaining CD images of Chest CT done in [place] on 3-4-2020    Department Care Team Description   03/04/2020 Emergency Emergency [address] [place] [address]   [phone]   Margie Padilla MD    [address] [place] [address], [address] [place] [address]   [phone] (Fax)        [id number] CT ANGIOGRAM CHEST W WO CONTRAST ACCESSION NO: [id number]    ORDERED BY: Enrrique Silveira MD        DATE OF EXAM: 03/04/2020 07:45    REASON FOR EXAM: ^Text: PE suspected, high pretest prob        ADMISSION DATE: 03/04/2020 07:02                CT PULMONARY ANGIOGRAPHY  (CTPA)         INDICATION: Pulmonary embolus suspected.        COMPARISON: None.                CONTRAST: 95 cc's iohexol (Omnipaque) 350.        TECHNIQUE:  Pulmonary CT angiography (CTPA) was performed with a thin-section multidetector volume acquisition of the chest during the bolus administration non-ionic contrast.  3-D reconstructed images were rendered, including 3-D coronal angiographic MIPS.  Axial and 3-D coronal MIPS were stored on PACS and utilized for interpretation.       FINDINGS:     Respiratory motion artifact is present.    PULMONARY ARTERIES: No evidence of filling defects.        RIGHT VENTRICULAR DILATATION: None.        PULMONARY PARENCHYMA and PLEURA: A moderate right pleural effusion is present with compressive atelectasis.  Additionally, within the superior segment of the right lower lobe is a 23 mm pulmonary nodule present posteriorly.  There are numerous additional sub centimeter pulmonary nodules present bilaterally many of which are pleural or fissural based.  The next largest present on the right is along the right minor fissure and measures 9 mm in greatest dimension (series 3 image 61).  The largest on the left is present in the perihilar region of the left upper lobe measures 6 mm in size (series 3 image 58).         HEART and AORTA: Unremarkable.        JARED and MEDIASTINUM: There is right hilar and mediastinal adenopathy present.  The largest node is present in the subcarinal region and measures 17 mm in size.          SOFT TISSUES, SKELETAL and UPPER ABDOMEN: There is an indeterminate sub-centimeter hypodensity in the right hepatic lobe (axial image 16).   Simple-appearing left renal cyst is partially visualized.

## 2020-03-14 NOTE — PROGRESS NOTES
Patient obtaining CD images of Chest CT done in [place] on 3-4-2020    Department Care Team Description   03/04/2020 Emergency Emergency [address]   [address]   [phone]   Roseanna Quinonez MD    [facility] [address]   [phone] (Fax)          [id number] CT ANGIOGRAM CHEST W WO CONTRAST ACCESSION NO: [id number]    ORDERED BY: Doc Gan MD        DATE OF EXAM: 03/04/2020 07:45    REASON FOR EXAM: ^Text: PE suspected, high pretest prob        ADMISSION DATE: 03/04/2020 07:02                CT PULMONARY ANGIOGRAPHY  (CTPA)         INDICATION: Pulmonary embolus suspected.        COMPARISON: None.                CONTRAST: 95 cc's iohexol (Omnipaque) 350.        TECHNIQUE:  Pulmonary CT angiography (CTPA) was performed with a thin-section multidetector volume acquisition of the chest during the bolus administration non-ionic contrast.  3-D reconstructed images were rendered, including 3-D coronal angiographic MIPS.  Axial and 3-D coronal MIPS were stored on PACS and utilized for interpretation.                   FINDINGS:     Respiratory motion artifact is present.    PULMONARY ARTERIES: No evidence of filling defects.        RIGHT VENTRICULAR DILATATION: None.        PULMONARY PARENCHYMA and PLEURA: A moderate right pleural effusion is present with compressive atelectasis.  Additionally, within the superior segment of the right lower lobe is a 23 mm pulmonary nodule present posteriorly.  There are numerous additional sub centimeter pulmonary nodules present bilaterally many of which are pleural or fissural based.  The next largest present on the right is along the right minor fissure and measures 9 mm in greatest dimension (series 3 image 61).   The largest on the left is present in the perihilar region of the left upper lobe measures 6 mm in size (series 3 image 58).         HEART and AORTA: Unremarkable.        JARED and MEDIASTINUM: There is right hilar and mediastinal adenopathy present.  The largest node is present in the subcarinal region and measures 17 mm in size.          SOFT TISSUES, SKELETAL and UPPER ABDOMEN: There is an indeterminate sub-centimeter hypodensity in the right hepatic lobe (axial image 16).   Simple-appearing left renal cyst is partially visualized.

## 2020-03-14 NOTE — TELEPHONE ENCOUNTER
Called pt Saturday afternoon to review all the information detailed below. 2 patient identifiers verified. Re-summarized all the various test results we discussed previously including Chest CTA, Neck US, Neck CT. Reviewed findings of CT ABD/PELV done yesterday and radiologist's recommendations. Discussed concerns for possible metastatic disease and trying to locate primary source, currently suspicious for lung neoplasm but that we are relying on tissue diagnosis hopefully from lung biopsy which will be discussed in more detail at her upcoming appt w/ Dr. Soni Alonzo this week. Advised I do however want to proceed w/ Pelvic US to rule out the questionable ovarian lesion. As for the possible renal cyst, will hold off on additional renal imaging ct until further consultation w/ Oncology. Current plan:    1) Patient in process of trying to get CD of Chest CT from [place] sent to radiology and thoracic for upcoming appt    2) Schedule Pelvic US. Order placed. Patient aware and will await call from scheduling dept. 3) Referral to Oncology to be scheduled ~ 1 week out in time for Thoracic Consultation/Bx report review. PCP will place order for this consultation this week.  Patient aware and will await call from nurse about appt once we get that scheduled

## 2020-03-17 ENCOUNTER — HOSPITAL ENCOUNTER (OUTPATIENT)
Dept: ULTRASOUND IMAGING | Age: 65
Discharge: HOME | End: 2020-03-17
Attending: FAMILY MEDICINE
Payer: MEDICARE

## 2020-03-17 ENCOUNTER — OFFICE VISIT (OUTPATIENT)
Dept: SURGERY | Age: 65
End: 2020-03-17

## 2020-03-17 VITALS
HEIGHT: 65 IN | BODY MASS INDEX: 30.32 KG/M2 | OXYGEN SATURATION: 97 % | DIASTOLIC BLOOD PRESSURE: 106 MMHG | SYSTOLIC BLOOD PRESSURE: 177 MMHG | WEIGHT: 182 LBS | HEART RATE: 72 BPM | RESPIRATION RATE: 18 BRPM

## 2020-03-17 DIAGNOSIS — N83.8 BROAD LIGAMENT LACERATION SYNDROME: ICD-10-CM

## 2020-03-17 DIAGNOSIS — N83.8 OVARIAN MASS, RIGHT: ICD-10-CM

## 2020-03-17 DIAGNOSIS — R59.0 MEDIASTINAL LYMPHADENOPATHY: Primary | ICD-10-CM

## 2020-03-17 DIAGNOSIS — R93.5 ABNORMAL CT SCAN, PELVIS: ICD-10-CM

## 2020-03-17 PROCEDURE — 76830 TRANSVAGINAL US NON-OB: CPT

## 2020-03-17 RX ORDER — TRIAMTERENE/HYDROCHLOROTHIAZID 37.5-25 MG
1 CAPSULE ORAL DAILY
COMMUNITY
End: 2020-06-23 | Stop reason: SDUPTHER

## 2020-03-17 NOTE — PROGRESS NOTES
New Patient. 1. Have you been to the ER, urgent care clinic since your last visit? Hospitalized since your last visit? Urgent Care in [facility] for tightness in chest.    2. Have you seen or consulted any other health care providers outside of the [address] [place] [address] since your last visit? Include any pap smears or colon screening. Yes, Urgent Care in [facility]. Patient stated that she has been taking only 1 bp capsule daily but the RX was written for 2 daily. She stated that she will start taking 2 again since her BP is so high.

## 2020-03-17 NOTE — PROGRESS NOTES
History and Physical    Subjective: Maria Luisa Philippe is a 71 y.o. female who presents with feeling of chest tightness and right sided pain with deep breaths. Onset of symptoms was over several weeks with The pain is located on the right back and flank. . Pain has been associated with deep breaths. Patient denies shortness of breath or hemoptysis. Denies weight loss. Has occasional cough. Pt forgot to bring the CT scan disc to the appointment today. This was done in [place]. Pt works in [email or web address], no exposure to chemicals and some second hand TOB exposure from .       Past Medical History:   Diagnosis Date    Benign essential hypertension 2020    Diagnosed in her 28's    Complete hearing loss of right ear 2020, s/p trauma w/ resultant surgery ending in complete hearing loss    Postoperative hypothyroidism 2020    S/p total thyroidectomy  for nodular/cystic goiter, benign    S/P total thyroidectomy 2020    Surgery 2015 for Multinodular Goiter, Cystic masses, Benign      Past Surgical History:   Procedure Laterality Date    HX BREAST BIOPSY Left     Benign Cyst Removed    HX  SECTION  ,     HX COLONOSCOPY  2015    Normal done in [place], repeat in 5 yrs due to family hx    HX CYST REMOVAL      Skin on back of neck    HX HEENT      Right Ear Surgery, Traumatic Scars    HX PARTIAL HYSTERECTOMY  in her 42's    Uterine Fibroids, Left Ovarian Cysts-Left Oopherectomy    HX THYROIDECTOMY  2015    Cystic Goiter    IR FINE NEEDLE ASPIRATION THYROID  2013    Benign Cysts     Family History   Problem Relation Age of Onset    Lung Cancer Mother          age 68, former smoker    Breast Cancer Mother         diagnosed w/ breast cancer in her late 50's/early 63's, surgery, survivor     Hypertension Mother     Prostate Cancer Father          in his early [de-identified] Hypertension Sister     Diabetes Sister     Breast Cancer Sister         diagnosed in her early 42's, treated w/ radiation and surgery    Prostate Cancer Brother         survivor    Colon Cancer Brother         diagnosed in his 52's    Asthma Son     No Known Problems Daughter     Hypertension Sister     High Cholesterol Sister     Hypertension Brother     Obesity Brother     No Known Problems Brother     Heart Disease Brother       Social History     Tobacco Use    Smoking status: Never Smoker    Smokeless tobacco: Never Used   Substance Use Topics    Alcohol use: Yes     Comment: occsaional glass of wine       Prior to Admission medications    Medication Sig Start Date End Date Taking? Authorizing Provider   Dyazide 37.5-25 mg per capsule Take 1 Cap by mouth daily. Yes Provider, Historical   Synthroid 88 mcg tablet Take 1 Tab by mouth Daily (before breakfast). -Take 1 tab once a day on Mon-Thur  -Take 1/2 tab on Fri  -Skip on Sat & Sun 3/11/20  Yes Buzz Becker MD   spironolactone (ALDACTONE) 50 mg tablet Take 1 Tab by mouth two (2) times a day. 3/11/20   Pino Chacko MD     Allergies   Allergen Reactions    Sulfa (Sulfonamide Antibiotics) Swelling     Facial swelling     Symbicort [Budesonide-Formoterol] Other (comments)     Hyper, insomnia, nightmares        Review of Systems:  Per HPI. Rest negative. Objective: Intake and Output:      Physical Exam:   Visit Vitals  BP (!) 177/106 (BP 1 Location: Left arm, BP Patient Position: Sitting)   Pulse 72   Resp 18   Ht 5' 5.25\" (1.657 m)   Wt 82.6 kg (182 lb)   SpO2 97%   BMI 30.05 kg/m²     General:  Alert, cooperative, no distress, appears stated age. Head:  Normocephalic, without obvious abnormality, atraumatic. Eyes:  Conjunctivae/corneas clear. PERRL, EOMs intact. Fundi benign. Ears:  Normal TMs and external ear canals both ears. Nose: Nares normal. Septum midline. Mucosa normal. No drainage or sinus tenderness.    Throat: Lips, mucosa, and tongue normal. Teeth and gums normal.   Neck: Supple, symmetrical, trachea midline, no adenopathy, thyroid: no carotid bruit and no JVD. LARGE RIGHT NECK MASS, FIRM. NON-MOBILE     Back:   Symmetric, no curvature. ROM normal. No CVA tenderness. Lungs:   Clear to auscultation bilaterally. Chest wall:  No tenderness or deformity. Heart:  Regular rate and rhythm, S1, S2 normal, no murmur, click, rub or gallop. Breast Exam:  No tenderness, masses, or nipple abnormality. Abdomen:   Soft, non-tender. Bowel sounds normal. No masses,  No organomegaly. Genitalia:  Normal female without lesion, discharge or tenderness. Rectal:  Normal tone,  no masses or tenderness  Guaiac negative stool. Extremities: Extremities normal, atraumatic, no cyanosis or edema. Pulses: 2+ and symmetric all extremities. Skin: Skin color, texture, turgor normal. No rashes or lesions. Lymph nodes: Cervical, supraclavicular, and axillary nodes normal.   Neurologic: CNII-XII intact. Normal strength, sensation and reflexes throughout. Data Review:   Ct ABDOMEN and neck reviewed. Rt supraclavicular LN, 4R, 7 LN and moderate right pleural effusion    Assessment:     Likely a malignant process, ?  Lung primary, images not available  Needs tissue diagnosis      Plan:     Get US guided right supraclavicular LN biopsy  PET scan  Referral to medical oncology, done by PCP  FU PRN    Signed By: Brad Myers MD     March 17, 2020

## 2020-03-18 DIAGNOSIS — R59.0 MEDIASTINAL LYMPHADENOPATHY: Primary | ICD-10-CM

## 2020-03-22 ENCOUNTER — TELEPHONE (OUTPATIENT)
Dept: FAMILY MEDICINE CLINIC | Age: 65
End: 2020-03-22

## 2020-03-22 DIAGNOSIS — N83.8 MASS OF LEFT OVARY: Primary | ICD-10-CM

## 2020-03-22 DIAGNOSIS — C79.9 METASTATIC DISEASE (HCC): ICD-10-CM

## 2020-03-22 DIAGNOSIS — N83.291 COMPLEX CYST OF RIGHT OVARY: ICD-10-CM

## 2020-03-22 NOTE — TELEPHONE ENCOUNTER
1) Advise pt her pelvic US shows a complex cyst in her right ovary and a poorly defined nodule in the left ovary that the radiologist reports as raising possible concern. I would like to refer her to Gyn-Oncology for further evaluation and mgt. I have placed order, please get this scheduled asap. (\"Impression: 3.8 cm complex cyst in the right ovary as described on the prior CT  examination. Poor definition and heterogeneity of the left ovary is concerning for underlying nodule. \")    2) It appears she is scheduled for lung biopsy w/ Dr. Jessica Pickering 3/23. That tissue pathology will be helpful in guiding treatment. I have placed that referral to general Oncology as well. Try to get this scheduled about a week after the Gyn-Oncology appt please. It will be helpful for them to put more of a complete pic together. So hopefully that will give time for the lung biopsy and gyn-onc evaluation/results to come back for more of a thorough mgt plan per oncology.

## 2020-03-23 ENCOUNTER — HOSPITAL ENCOUNTER (EMERGENCY)
Age: 65
Discharge: HOME | End: 2020-03-23
Attending: EMERGENCY MEDICINE
Payer: MEDICARE

## 2020-03-23 ENCOUNTER — HOSPITAL ENCOUNTER (OUTPATIENT)
Dept: ULTRASOUND IMAGING | Age: 65
Discharge: HOME | End: 2020-03-23
Attending: THORACIC SURGERY (CARDIOTHORACIC VASCULAR SURGERY)
Payer: MEDICARE

## 2020-03-23 VITALS
BODY MASS INDEX: 28.93 KG/M2 | WEIGHT: 180 LBS | RESPIRATION RATE: 16 BRPM | HEIGHT: 66 IN | TEMPERATURE: 98 F | DIASTOLIC BLOOD PRESSURE: 100 MMHG | SYSTOLIC BLOOD PRESSURE: 175 MMHG | HEART RATE: 85 BPM | OXYGEN SATURATION: 100 %

## 2020-03-23 DIAGNOSIS — I82.C11 ACUTE THROMBOSIS OF RIGHT INTERNAL JUGULAR VEIN (HCC): Primary | ICD-10-CM

## 2020-03-23 DIAGNOSIS — R59.0 MEDIASTINAL LYMPHADENOPATHY: ICD-10-CM

## 2020-03-23 PROCEDURE — 88341 IMHCHEM/IMCYTCHM EA ADD ANTB: CPT

## 2020-03-23 PROCEDURE — 99283 EMERGENCY DEPT VISIT LOW MDM: CPT

## 2020-03-23 PROCEDURE — 88305 TISSUE EXAM BY PATHOLOGIST: CPT

## 2020-03-23 PROCEDURE — 10005 FNA BX W/US GDN 1ST LES: CPT

## 2020-03-23 PROCEDURE — 88342 IMHCHEM/IMCYTCHM 1ST ANTB: CPT

## 2020-03-23 PROCEDURE — 88173 CYTOPATH EVAL FNA REPORT: CPT

## 2020-03-23 PROCEDURE — 88172 CYTP DX EVAL FNA 1ST EA SITE: CPT

## 2020-03-23 NOTE — ED PROVIDER NOTES
EMERGENCY DEPARTMENT HISTORY AND PHYSICAL EXAM      Date: 3/23/2020  Patient Name: Chucky Villarreal    History of Presenting Illness     Chief Complaint   Patient presents with   Monique Referral / Consult     pt sent here from PCP for + DVT in right internal jugular vein. pt was getting a biopsy and they found the clot       History Provided By: Patient    HPI: Chucky Villarreal, 71 y.o. female  presents to the ED with cc of referral for a deep vein thrombosis in the right internal jugular vein. Patient is undergoing work-up for a mass in the right side of her neck. She had a fine-needle biopsy performed today and during the procedure they noticed with ultrasound that she had a thrombus in the right internal jugular vein. They feel it is likely due to compression of the vein by the mass. The patient denies any headaches. She states her face is been a \"little bit puffy\". She has not had any severe facial swelling. No severe neck swelling. She states she does have some mild shortness of breath but this is been ongoing during her disease process because she has fluid on the right lung. She has no swelling or pain in her legs. She currently does not take any blood thinners nor antiplatelets. No history of PE or DVT. There are no other complaints, changes, or physical findings at this time. PCP: Jose Wilson MD    No current facility-administered medications on file prior to encounter. Current Outpatient Medications on File Prior to Encounter   Medication Sig Dispense Refill    Dyazide 37.5-25 mg per capsule Take 1 Cap by mouth daily.  spironolactone (ALDACTONE) 50 mg tablet Take 1 Tab by mouth two (2) times a day. 60 Tab 1    Synthroid 88 mcg tablet Take 1 Tab by mouth Daily (before breakfast).  -Take 1 tab once a day on Mon-Thur  -Take 1/2 tab on Fri  -Skip on Sat & Sun         Past History     Past Medical History:  Past Medical History:   Diagnosis Date    Benign essential hypertension 2020    Diagnosed in her 29's    Complete hearing loss of right ear 2020    1982, s/p trauma w/ resultant surgery ending in complete hearing loss    Postoperative hypothyroidism 2020    S/p total thyroidectomy 2015 for nodular/cystic goiter, benign    S/P total thyroidectomy 2020    Surgery 2015 for Multinodular Goiter, Cystic masses, Benign       Past Surgical History:  Past Surgical History:   Procedure Laterality Date    HX BREAST BIOPSY Left     Benign Cyst Removed    HX  SECTION  ,     HX COLONOSCOPY  2015    Normal done in [place], repeat in 5 yrs due to family hx    HX CYST REMOVAL      Skin on back of neck    HX HEENT      Right Ear Surgery, Traumatic Scars    HX PARTIAL HYSTERECTOMY  in her 42's    Uterine Fibroids, Left Ovarian Cysts-Left Oopherectomy    HX THYROIDECTOMY      Cystic Goiter    IR FINE NEEDLE ASPIRATION THYROID  2013    Benign Cysts       Family History:  Family History   Problem Relation Age of Onset    Lung Cancer Mother          age 68, former smoker    Breast Cancer Mother         diagnosed w/ breast cancer in her late 50's/early 63's, surgery, survivor     Hypertension Mother     Prostate Cancer Father          in his early [de-identified] Hypertension Sister     Diabetes Sister    [facility] Breast Cancer Sister         diagnosed in her early 42's, treated w/ radiation and surgery    Prostate Cancer Brother         survivor    Colon Cancer Brother         diagnosed in his 52's    Asthma Son     No Known Problems Daughter     Hypertension Sister     High Cholesterol Sister     Hypertension Brother     Obesity Brother     No Known Problems Brother     Heart Disease Brother        Social History:  Social History     Tobacco Use    Smoking status: Never Smoker    Smokeless tobacco: Never Used   Substance Use Topics    Alcohol use: Yes     Comment: occsaional glass of wine    Drug use: Never Allergies: Allergies   Allergen Reactions    Sulfa (Sulfonamide Antibiotics) Swelling     Facial swelling     Symbicort [Budesonide-Formoterol] Other (comments)     Hyper, insomnia, nightmares         Review of Systems   Review of Systems   Constitutional: Negative for chills and fever. HENT: Positive for facial swelling. Negative for congestion, ear pain, rhinorrhea, sore throat and trouble swallowing. Eyes: Negative for visual disturbance. Respiratory: Positive for shortness of breath. Negative for cough and chest tightness. Cardiovascular: Negative for chest pain and palpitations. Gastrointestinal: Negative for abdominal pain, blood in stool, constipation, diarrhea, nausea and vomiting. Genitourinary: Negative for decreased urine volume, difficulty urinating, dysuria and frequency. Musculoskeletal: Negative for back pain and neck pain. Skin: Negative for color change and rash. Neurological: Negative for dizziness, weakness, light-headedness and headaches. Physical Exam   Physical Exam  Vitals signs and nursing note reviewed. Constitutional:       General: She is not in acute distress. Appearance: She is well-developed. She is not ill-appearing. Eyes:      Conjunctiva/sclera: Conjunctivae normal.   Neck:      Musculoskeletal: Neck supple. Cardiovascular:      Rate and Rhythm: Normal rate and regular rhythm. Pulmonary:      Effort: Pulmonary effort is normal. No accessory muscle usage or respiratory distress. Breath sounds: Normal breath sounds. Abdominal:      General: There is no distension. Palpations: Abdomen is soft. Tenderness: There is no abdominal tenderness. Lymphadenopathy:      Cervical: No cervical adenopathy. Skin:     General: Skin is warm and dry. Neurological:      Mental Status: She is alert and oriented to person, place, and time. Cranial Nerves: No cranial nerve deficit. Sensory: No sensory deficit. Diagnostic Study Results     I reviewed the patient's fine-needle biopsy report from today. I have also reviewed a CTA of the chest that was obtained on March 4, 2020 that did not indicate any signs of pulmonary embolism. Medical Decision Making   I am the first provider for this patient. I reviewed the vital signs, available nursing notes, past medical history, past surgical history, family history and social history. Vital Signs-Reviewed the patient's vital signs. Patient Vitals for the past 24 hrs:   Temp Pulse Resp BP SpO2   03/23/20 1446 98 °F (36.7 °C) 79 18 (!) 196/107 99 %         Records Reviewed: Nursing Notes, Old Medical Records and Previous Radiology Studies    Provider Notes (Medical Decision Making):   Patient presents with a thrombosis in the right internal jugular vein. She does not have any severe headaches. I have no suspicion for cerebral venous thrombosis. She has no significant swelling of the neck or face. I do not suspect SVC syndrome. She is not hypoxic or short of breath. She is not tachycardic. I do not feel that she has pulmonary embolism and she had a CTA of the chest performed earlier this month which did not show any PE. I do not think there will be any benefit in obtaining repeat scans of the lungs for PE. Like the best thing to do at this point time is to anticoagulate her and have her follow-up with her specialist and PCP. She does not report any history of bleeding such as GI bleeding. I have counseled her regarding anticoagulants and the high risk of bleeding. Return to the emergency department if she notices any blood in her stool or any severe bleeding. I did warn her that with the fine-needle biopsy performed today she may have some bleeding at the biopsy site. Return to the ER if she has any significant swelling at the neck such as from a hematoma. ED Course:   Initial assessment performed.  The patients presenting problems have been discussed, and they are in agreement with the care plan formulated and outlined with them. I have encouraged them to ask questions as they arise throughout their visit. Orders Placed This Encounter    apixaban (ELIQUIS) 5 mg (74 tabs) starter pack       Procedures      Critical Care Time:   0    Disposition:  Discharge  3:37 PM    The patient's emergency department evaluation is now complete. I have reviewed all labs, imaging, and pertinent information. I have discussed all results with the patient and/or family. Based on our evaluation today I do believe that the patient is safe to be discharged home. The patient has been provided with at home instructions that are pertinent to their complaint today, although these may not be specific to the exact diagnosis. I have reviewed the patient's home medications and attempted to reconcile if not already done so by pharmacy or nursing staff. I have discussed all new prescriptions with the patient. The patient has been encouraged to follow-up with primary care doctor and/or specialist, and these have been discussed with the patient. The patient has been advised that they may return to the emergency department if they have any worsening symptoms and or new symptoms that are of concern to them. Verbal discharge instructions may have also been provided to the patient that may not be specifically contained in the written discharge instructions. The patient has been given opportunity to ask questions prior to discharge. PLAN:  1. Current Discharge Medication List      START taking these medications    Details   apixaban (ELIQUIS) 5 mg (74 tabs) starter pack Take 10 mg (two 5 mg tablets) by mouth twice a day for 7 days Followed by 5 mg (one 5 mg tablet) by mouth twice a day  Qty: 1 Dose Pack, Refills: 0           2.    Follow-up Information     Follow up With Specialties Details Why Contact Info    Sherlyn Mortensen MD Family Practice Schedule an appointment as soon as possible for a visit in 1 week  [address]  [phone]          Return to ED if worse     Diagnosis     Clinical Impression:   1. Acute thrombosis of right internal jugular vein ([address]            This note will not be viewable in MyChart.

## 2020-03-23 NOTE — TELEPHONE ENCOUNTER
I called pt with results. I scheduled her with Dr Abhijit Aguayo for 3/25 @ 11:00 arrival time for 10:30. I let pt know and she wants to call and see about getting a later time. She also reports that she is thinking about going down to her family in [place]. There is a [facility] down there in [place] and that is close to her families house. They have a cancer practice there. She will know later today or tomorrow about what she will do. She will call me back to let me know. Call from imaging dept re: DVT in right internal jugular vein.   instructed Ta to send pt to ER for eval

## 2020-03-24 NOTE — TELEPHONE ENCOUNTER
----- Message from Luisa Salinas sent at 3/24/2020 11:51 AM EDT -----  Regarding: Dr. Naomi Peña: [phone]  Caller's first and last name: Pt  Reason for call: Pt would like to know if a referral has been sent for her to see an oncologist,  if not she would like to have one done.   Callback required yes/no and why: y  Best contact number(s): [phone]  Details to clarify the request: n/a

## 2020-03-25 ENCOUNTER — TELEPHONE (OUTPATIENT)
Dept: FAMILY MEDICINE CLINIC | Age: 65
End: 2020-03-25

## 2020-03-25 NOTE — TELEPHONE ENCOUNTER
----- Message from Kira Gant sent at 3/25/2020  2:54 PM EDT -----  Regarding: Dr. Gunner Sewell (first and last name if not the patient or from practice):  Caller's relationship to patient (if not from a practice):  Name of caller (if calling from a practice):  [facility]  Patient insurance Type: [facility]  Name of practice:  [facility]  Specialist's title, first, and last name: Dr. Yesenia Brito  Specialist Type: oncologist  Office Phone Number: [phone]  Fax number: [phone]  Date and time of appointment: April 2nd at 8:15 am  Reason for appointment: metastatic disease  Details to clarify the request:

## 2020-03-27 NOTE — TELEPHONE ENCOUNTER
Called pt to let her know that she will need to contact Dr Colton Madrid office for report. She then said that she had gone to the ER and dx with DVT. She was instructed to f/u with PCP. Told her that I would check with Dr Yumiko Thorne b/c we don't really want her to come here b/c of her being high risk.

## 2020-03-27 NOTE — TELEPHONE ENCOUNTER
Pt states that she wants to get things started now before she goes to [place]. She wants to have a plan.   She is scheduled for gyn/oncologist and the next day she is scheduled with oncologist.

## 2020-03-27 NOTE — TELEPHONE ENCOUNTER
----- Message from David White sent at 3/27/2020  1:33 PM EDT -----  Regarding: Dr. Amalia Torres: [phone]  Caller's first and last name: n/a  Reason for call: Results of neck biopsy done on 3- by Dr. Merline Sierras at [address] [place] [address].   Callback required yes/no and why:  Best contact number(s): [phone]  Details to clarify the request: n/a

## 2020-04-01 ENCOUNTER — OFFICE VISIT (OUTPATIENT)
Dept: GYNECOLOGY | Age: 65
End: 2020-04-01

## 2020-04-01 VITALS
HEART RATE: 83 BPM | SYSTOLIC BLOOD PRESSURE: 130 MMHG | DIASTOLIC BLOOD PRESSURE: 86 MMHG | HEIGHT: 66 IN | WEIGHT: 178.2 LBS | BODY MASS INDEX: 28.64 KG/M2 | TEMPERATURE: 98.1 F

## 2020-04-01 DIAGNOSIS — E89.0 S/P TOTAL THYROIDECTOMY: ICD-10-CM

## 2020-04-01 DIAGNOSIS — R22.1 NECK MASS: Primary | ICD-10-CM

## 2020-04-01 DIAGNOSIS — R59.0 LYMPHADENOPATHY, RETROPERITONEAL: ICD-10-CM

## 2020-04-01 DIAGNOSIS — I10 BENIGN ESSENTIAL HYPERTENSION: ICD-10-CM

## 2020-04-01 DIAGNOSIS — J90 PLEURAL EFFUSION: ICD-10-CM

## 2020-04-01 DIAGNOSIS — R91.8 LUNG NODULES: ICD-10-CM

## 2020-04-01 DIAGNOSIS — R19.00 PELVIC MASS: ICD-10-CM

## 2020-04-01 NOTE — LETTER
4/2/20 Patient: [address] [facility] YOB: 1955 Date of Visit: 4/1/2020 Babs Desai MD 
[address] [facility] [address] VIA In Basket Dear Babs Desai MD, Thank you for referring Ms. uGrinder De to [facility] for evaluation. My notes for this consultation are attached. If you have questions, please do not hesitate to call me. I look forward to following your patient along with you.  
 
 
Sincerely, 
 
Sherwin Hernandez MD

## 2020-04-01 NOTE — PROGRESS NOTES
Referred by Dr. Avelina Solano for ovarian cyst, pt complains of right sided abdominal pain, near her rib cage as well, and her shoulders      1. Have you been to the ER, urgent care clinic since your last visit? Hospitalized since your last visit? Yes to ER on 3/23/2020     2. Have you seen or consulted any other health care providers outside of the [address] [place] [address] since your last visit? Include any pap smears or colon screening.    no

## 2020-04-02 ENCOUNTER — DOCUMENTATION ONLY (OUTPATIENT)
Dept: ONCOLOGY | Age: 65
End: 2020-04-02

## 2020-04-02 ENCOUNTER — OFFICE VISIT (OUTPATIENT)
Dept: ONCOLOGY | Age: 65
End: 2020-04-02

## 2020-04-02 VITALS
OXYGEN SATURATION: 98 % | TEMPERATURE: 97.9 F | HEART RATE: 79 BPM | WEIGHT: 178.6 LBS | DIASTOLIC BLOOD PRESSURE: 87 MMHG | BODY MASS INDEX: 28.7 KG/M2 | SYSTOLIC BLOOD PRESSURE: 135 MMHG | HEIGHT: 66 IN

## 2020-04-02 DIAGNOSIS — R59.0 LYMPHADENOPATHY, RETROPERITONEAL: ICD-10-CM

## 2020-04-02 DIAGNOSIS — C34.90 MALIGNANT NEOPLASM OF LUNG, UNSPECIFIED LATERALITY, UNSPECIFIED PART OF LUNG (HCC): Primary | ICD-10-CM

## 2020-04-02 DIAGNOSIS — R91.8 LUNG NODULES: ICD-10-CM

## 2020-04-02 DIAGNOSIS — J90 PLEURAL EFFUSION: ICD-10-CM

## 2020-04-02 DIAGNOSIS — C34.90 MALIGNANT NEOPLASM OF LUNG, UNSPECIFIED LATERALITY, UNSPECIFIED PART OF LUNG (HCC): ICD-10-CM

## 2020-04-02 PROBLEM — R22.1 NECK MASS: Status: ACTIVE | Noted: 2020-04-02

## 2020-04-02 PROBLEM — R19.00 PELVIC MASS: Status: ACTIVE | Noted: 2020-04-02

## 2020-04-02 NOTE — PROGRESS NOTES
Tico Delgadillo is a 71 y.o. female  Chief Complaint   Patient presents with    New Patient     1. Have you been to the ER, urgent care clinic since your last visit? Hospitalized since your last visit? 3/23/20 DVT  2. Have you seen or consulted any other health care providers outside of the [address] [place] [address] since your last visit? Include any pap smears or colon screening.  no

## 2020-04-02 NOTE — Clinical Note
Hi Dr Roceal Mejía  Trying to speed up things for her but she is moving to [place] in 1 week and wants most everything done there

## 2020-04-02 NOTE — PROGRESS NOTES
This note will not be viewable in [address] Oncology Navigator  Psychosocial Assessment    Reason for Assessment:    []Depression  []Anxiety  []Caregiver [place]  []Maladaptive Coping with Serious Illness   [] Social Work Referral [x] Initial Assessment  [] Other     Sources of Information:    [x]Patient  []Family  []Staff  []Medical Record    Advance Care Planning:  No flowsheet data found. Patient does not have an advanced medical directive, and did not express interest in completing one today.     Mental Status:    [x]Alert  []Lethargic  []Unresponsive   [] Unable to assess   Oriented to:  [x]Person  [x]Place  [x]Time  [x]Situation      Barriers to Learning:    []Language  []Developmental  []Cognitive  []Altered Mental Status  []Visual/Hearing Impairment  []Unable to Read/Write  []Motivational   [x]No Barriers Identified  []Other:    Relationship Status:  []Single  [x]  []Significant Other/Life Partner  []  []  []      Living Circumstances:  []Lives Alone  [x]Family/Significant Other in Household  []Roommates  []Children in the Home  []Paid Caregivers  []Assisted Living Facility/Group Home  []Skilled [address]  []Homeless  []Incarcerated  []Environmental/Care Concerns  []other:    Employment Status:  [x]Employed Full-time []Employed Part-time []Homemaker [] Disabled  [] Retired []Other:    Support System:    [x]Strong  []Fair  []Limited    Financial/Legal Concerns:    []Uninsured  []Limited Income/Resources  []Non-Citizen  [x]No Concerns Identified  []Financial POA:    []Other:    Hindu/Spiritual/Existential:  []Strong Sense of Spirituality  []Involved in [facility]  []Request  Visit  []Expressing Rubi Blazer  [x]No Concerns Identified    Coping with Illness:         Patient: Family/Caregiver:   Understanding and Acceptance of Illness/Prognosis  [x] []   Strong Sense of Resilience [x] []   Self Reflection [x] []   Engaged Support System [x] []   Does not Readily Discuss Illness [] []   Denial of Terminal Status [] []   Anger [] []   Depression [] []   Anxiety/Fear [] []   Bargaining [] []   Recent Diagnosis/Prognosis [] []   Difficulties with Body Image [] []   Loss of Identity [] []   Excessive Substance Use [] []   Mental Health History [] []   Enmeshed Relationships [] []   History of Loss [] []   Anticipatory Grief [] []   Concern for Complicated Grief [] []   Suicidal Ideation or Plan [] []   Unable to assess [] [x]            Narrative:   Met with the patient during her office visit today. The patient is being seen for metastatic carcinoma. The patient showed limited health literacy, understanding, or insight into her diagnosis or urgency of care. The patient lives in an apartment in [place], [place], but plans to move to [place], [place] on April 10, 2020. Encouraged her to research travel restrictions due to COVID-19, and possibly get a medical note if needed. Her brother is a  and plans to assist her with the move. She explained that she owns a home in [place], and her  resides there. She explained that he has a chronic health condition. The patient and her  have a son in [place], [place] and a daughter in [place]. The patient has 3 sisters and 5 brothers. The patient stated \"all of my family lives in [place]. \"  The patient works from home. Patient asked if her sister could \"get paid to provide care for her. \"  Explained that [place] allows for consumer directed personal care aids, and encouraged her to apply in [place], if she meets the income guidelines for Medicaid. Patient states that she has \"way too much income\" to qualify for Medicaid based on our conversation. Encouraged her to visit  in [place] to apply for Medicaid if her circumstances change.       Assisted the patient in scheduling appointments with providers in [place]. [facility]   Address: [address] [place] [address] [place], [address] [place] [address]  Phone: [phone]  Appointment Date & Time: Monday, April 13, 2020. Labs at 10:30AM, appointment at 11:00AM   (Notes: Called and spoke with Christian Clemons who explained that they have 9 providers, but due to COVID-19 will only allow 2 providers in the office at once. They are unsure of who will be in the office that week, but will update the patient.)       [facility] ([facility]) - V#[phone]. (Notes: Spoke with Maria Teresa who explained that they will call the patient directly tomorrow to schedule her biopsy.)     Called to update the patient with this information (Patient Phone: [phone]). Offered continued support as needed during this time of transition. Assessment/Action:   1. Introduced self and role of this  in the [employer]. 2.  Ongoing psychosocial support as desired by patient. Plan/Referral:   Other referral (Coordination of Care.   Patient moving back to [place])  Tita Espinal LCSW
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
## 2020-04-02 NOTE — PROGRESS NOTES
[facility] [place] [address] [address], [address]  W: [phone]  F: [phone]    Reason for Visit:   iLvan Villanueva is a 71 y.o. female who is seen in consultation at the request of Dr. Cammy Gallagher for evaluation of metastatic carcinoma    Treatment History:   · 3/4/2020: CTA chest: Bilateral pulmonary nodules measuring up to 23 mm in size in the right lower lobe with mediastinal and right hilar adenopathy highly concerning for neoplastic process. Moderate right pleural effusion   · 3/13/2020: CT abdomen showed T11 lesion with erosion, lesions in T8, 10,12, L2-L5, 37 x 34 x 36 mm exophytic, interpolar, left renal mass hyperdense/hemorrhagic cyst is favored over papillary renal cell carcinoma, 30 x 36 x 36 mm, oval, circumscribed, hypodense, right ovarian lesion, supraclavicular and mediastinal lymphadenopathy  · 3/23/2020: Biopsy SCN-  Few CK7 positive cells are seen, which are negative for CK20, TTF-1, and Napsin A. This immunophenotype is not specific; therefore, rebiopsy or excision is recommended for further classification. History of Present Illness:   Patient is a 71 y.o. female seen for metastatic disease. She is from [place]. She states that she presented with sudden R chest pain/ felt like a pulled muscle. Rest and NSAIDS did not help, she had an X ray on 2/3/2020 which showed tace R effusion . She had to travel to [place] to tend to her  thereafter where she progressively was SOB and had CT chest which showed a moderate effusion on the R with multiple lung nodules. She came back to [facility] where she works for further evaluation. She was also noted to have a R neck mass which was biopsied and led to the diagnosis. She was also noted to have ovarian lesions which was evaluated by Dr. Fransico Allen in [address] [place] [address] and not felt to be malignant. She intends to go back to [place] but comes to discuss further work up.   She remains SOB with moderate activity with referred pain to her shoulder. She has R mid back pain which is relieved by Tylenol. No leg weakness, falls, bleeding, diarrhea, dysuria. No HA, she has noted some visual blurring. She has intermittent dry cough. She had a normal mammogram in 2020. 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 Mother had Lung cancer    She has never smoked.        Past Medical History:   Diagnosis Date    Benign essential hypertension 2020    Diagnosed in her 29's    Complete hearing loss of right ear 2020, s/p trauma w/ resultant surgery ending in complete hearing loss    Postoperative hypothyroidism 2020    S/p total thyroidectomy  for nodular/cystic goiter, benign    S/P total thyroidectomy 2020    Surgery 2015 for Multinodular Goiter, Cystic masses, Benign      Past Surgical History:   Procedure Laterality Date    HX BREAST BIOPSY Left     Benign Cyst Removed    HX  SECTION  ,     HX COLONOSCOPY  2015    Normal done in [place], repeat in 5 yrs due to family hx    HX CYST REMOVAL      Skin on back of neck    HX HEENT      Right Ear Surgery, Traumatic Scars    HX PARTIAL HYSTERECTOMY  in her 42's    Uterine Fibroids, Left Ovarian Cysts-Left Oopherectomy    HX THYROIDECTOMY  2015    Cystic Goiter    IR FINE NEEDLE ASPIRATION THYROID  2013    Benign Cysts      Social History     Tobacco Use    Smoking status: Never Smoker    Smokeless tobacco: Never Used   Substance Use Topics    Alcohol use: Yes     Comment: occsaional glass of wine      Family History   Problem Relation Age of Onset    Lung Cancer Mother          age 68, former smoker    Breast Cancer Mother         diagnosed w/ breast cancer in her late 50's/early 63's, surgery, survivor     Hypertension Mother     Prostate Cancer Father          in his early [de-identified] Hypertension Sister     Diabetes Sister     Breast Cancer Sister         diagnosed in her early 42's, treated w/ radiation and surgery    Prostate Cancer Brother         survivor    Colon Cancer Brother         diagnosed in his 52's    Asthma Son     No Known Problems Daughter     Hypertension Sister     High Cholesterol Sister     Hypertension Brother     Obesity Brother     No Known Problems Brother     Heart Disease Brother      Current Outpatient Medications   Medication Sig    apixaban (ELIQUIS) 5 mg (74 tabs) starter pack Take 10 mg (two 5 mg tablets) by mouth twice a day for 7 days Followed by 5 mg (one 5 mg tablet) by mouth twice a day    Dyazide 37.5-25 mg per capsule Take 1 Cap by mouth daily.  Synthroid 88 mcg tablet Take 1 Tab by mouth Daily (before breakfast). -Take 1 tab once a day on Mon-Thur  -Take 1/2 tab on Fri  -Skip on Sat & Sun    spironolactone (ALDACTONE) 50 mg tablet Take 1 Tab by mouth two (2) times a day. No current facility-administered medications for this visit. Allergies   Allergen Reactions    Sulfa (Sulfonamide Antibiotics) Swelling     Facial swelling     Symbicort [Budesonide-Formoterol] Other (comments)     Hyper, insomnia, nightmares        Review of Systems: A complete review of systems was obtained, negative except as described above. Physical Exam:     Visit Vitals  /87   Pulse 79   Temp 97.9 °F (36.6 °C)   Ht 5' 6\" (1.676 m)   Wt 178 lb 9.6 oz (81 kg)   SpO2 98%   BMI 28.83 kg/m²     ECOG PS: 1  General: No distress  Eyes: PERRLA, anicteric sclerae  HENT: Atraumatic, OP clear  Neck: Supple  Lymphatic: R palpable supraclavicular node  Respiratory: normal respiratory effort  CV: Normal rate, no peripheral edema  GI: Soft, nondistended, no masses  MS: Normal gait and station. Digits without clubbing or cyanosis. Skin: No rashes, ecchymoses, or petechiae. Normal temperature, turgor, and texture.   Psych: Alert, oriented, appropriate affect, normal judgment/insight    Results:     Lab Results   Component Value Date/Time    WBC 9.0 01/21/2020 12:35 PM    HGB 13.2 01/21/2020 12:35 PM    HCT 40.8 01/21/2020 12:35 PM    PLATELET 643 91/39/8436 12:35 PM    MCV 76 (L) 01/21/2020 12:35 PM     Lab Results   Component Value Date/Time    Sodium 142 01/21/2020 12:35 PM    Potassium 3.9 01/21/2020 12:35 PM    Chloride 98 01/21/2020 12:35 PM    CO2 26 01/21/2020 12:35 PM    Glucose 88 01/21/2020 12:35 PM    BUN 15 01/21/2020 12:35 PM    Creatinine 0.95 01/21/2020 12:35 PM    GFR est AA 73 01/21/2020 12:35 PM    GFR est non-AA 63 01/21/2020 12:35 PM    Calcium 10.4 (H) 01/21/2020 12:35 PM     Lab Results   Component Value Date/Time    Bilirubin, total 0.3 01/21/2020 12:35 PM    ALT (SGPT) 17 01/21/2020 12:35 PM    AST (SGOT) 15 01/21/2020 12:35 PM    Alk. phosphatase 84 01/21/2020 12:35 PM    Protein, total 7.6 01/21/2020 12:35 PM    Albumin 4.7 01/21/2020 12:35 PM         Records reviewed and summarized above. Pathology report(s) reviewed above. Radiology report(s) reviewed above    CT neck    FINDINGS:  Centrally necrotic right level 4/supraclavicular tariq mass measuring 3.2 x 2.4  x 3.1 cm (series 3 image 86). This surrounds and severely narrows the right  internal jugular vein (series 3 image 90). There is a centrally necrotic right  paratracheal lymph node measuring 2.3 x 2.1 x 4.8 cm (series 3 image 110). There  is a partially visualized centrally necrotic subcarinal lymph node measuring 1.3  x 1.2 cm (series 3 image 120). No other pathologically enlarged cervical lymph  nodes. Partially visualized large right pleural effusion.     Postsurgical changes of total thyroidectomy, with perhaps trace residual thyroid  tissue within the thyroid bed. Visualized brain parenchyma is normal in  appearance. Paranasal sinuses and mastoid air cells are clear. There are  postsurgical changes of right mastoidectomy. Intraorbital contents are  unremarkable.  The cervical arterial vasculature is patent. No acute fracture or  aggressive osseous lesion. Mild degenerative disc disease in the cervical spine.     IMPRESSION  IMPRESSION:   1. Necrotic right level 4/supraclavicular tariq mass measuring 3.2 x 2.4 x 3.1  cm, which surrounds and severely narrows the right internal jugular vein  focally. Additional necrotic right paratracheal and subcarinal lymph nodes in  the mediastinum as above. Findings are consistent with necrotic tariq  metastases. Recommend CT chest, abdomen and pelvis to evaluate for primary  malignancy. 2. Partially visualized large right pleural effusion.     23X    CT chest abdomen pelvis 3/2020      FINDINGS:     Respiratory motion artifact is present.    PULMONARY ARTERIES: No evidence of filling defects.        RIGHT VENTRICULAR DILATATION: None.        PULMONARY PARENCHYMA and PLEURA: A moderate right pleural effusion is present with compressive atelectasis.  Additionally, within the superior segment of the right lower lobe is a 23 mm pulmonary nodule present posteriorly.  There are numerous additional sub centimeter pulmonary nodules present bilaterally many of which are pleural or fissural based.  The next largest present on the right is along the right minor fissure and measures 9 mm in greatest dimension (series 3 image 61).  The largest on the left is present in the perihilar region of the left upper lobe measures 6 mm in size (series 3 image 58).         HEART and AORTA: Unremarkable.        JARED and MEDIASTINUM: There is right hilar and mediastinal adenopathy present.  The largest node is present in the subcarinal region and measures 17 mm in size.          SOFT TISSUES, SKELETAL and UPPER ABDOMEN: There is an indeterminate sub-centimeter hypodensity in the right hepatic lobe (axial image 16).  Simple-appearing left renal cyst is partially visualized.          IMPRESSION  IMPRESSION:   1.  The chest is incompletely imaged between CT neck 3/12/2020 and this CT  abdomen pelvis examination. Chest CT with IV contrast is recommended. 2. Given right supraclavicular and mediastinal lymphadenopathy, and a moderate  ipsilateral pleural effusion, right lung carcinoma is strongly suspected. 3.  image shows a probable right middle lobe nodule (18 x 19 mm), superior  to the field-of-view of CT images. 4. No definite pleural nodularity. Malignant right pleural effusion is  suspected, however. 5. T11 bony metastasis with subtle extracortical extension. Probable,  additional, subcentimeter, more subtle, osseous metastases. 6. Indeterminate left renal mass (36 mm). A hyperdense/hemorrhagic cyst is  favored over papillary renal cell carcinoma. Renal protocol CT is recommended to  confirm. 7. Probable right ovarian cyst (36 mm). Transvaginal ultrasound is recommended  for confirmation. 8. Chest CT and renal protocol CT of the abdomen could be performed  simultaneously.     Assessment:   1) Metastatic malignancy of unknown primary    R pleural effusion, bilateral lung nodules, mediastinal and supraclavicular adenopathy and bone metastasis  Unrelated  findings- renal lesion- Left  Ovarian masses-     Pathology from R Supraclavicular node is non specific  Findings are suggestive of a lung primary however additional tissue is needed for diagnosis and molecular testing    She has limited insight and the seriousness of a stage IV malignancy was re iterated  She understands this is incurable  Stressed the importance of timely diagnosis and palliative measures  She is moving the [place] on 4/10 and prefers to have all testing done there    I have consulted SW to help us with timely referral and management    2) R effusion and SOB    Likely malignant  R thoracentesis with cytology    3) Blurred vision  MRI brain asap    4) Bone metastasis  Multiple   T11 lesion is destructive- minimal pain and no cord compression    Will need RT to this    Will need bisphosphonates    4) Ovarian masses  Seen by Dr. Yissel Bacon  Benign cyst vs metastatic disease  Ovarian primary is not likely    5) Left renal mass  Hemorrhagic cyst vs metastasis    6) Psychosocial  Limited understanding  Moving to [facility] soon   SW consulted    Plan:     · R ultrasound guided thoracentesis - cytology  · MRI brain  · CBC, CMP, LD  · Excisional biopsy of R supraclavicular node with NGS- Prefers to do in [facility]  · Referral to oncology in [facility]  · Will need RT to T11      She declined a follow up but if above imaging is concerning will reach out     I appreciate the opportunity to participate in Ms. Lashae Madrid's care.     Signed By: Eva Garcia MD

## 2020-04-03 ENCOUNTER — TELEPHONE (OUTPATIENT)
Dept: ONCOLOGY | Age: 65
End: 2020-04-03

## 2020-04-03 LAB
ALBUMIN SERPL-MCNC: 4.5 G/DL (ref 3.8–4.8)
ALBUMIN/GLOB SERPL: 1.7 {RATIO} (ref 1.2–2.2)
ALP SERPL-CCNC: 131 IU/L (ref 39–117)
ALT SERPL-CCNC: 17 IU/L (ref 0–32)
AST SERPL-CCNC: 19 IU/L (ref 0–40)
BASOPHILS # BLD AUTO: 0.1 X10E3/UL (ref 0–0.2)
BASOPHILS NFR BLD AUTO: 1 %
BILIRUB SERPL-MCNC: <0.2 MG/DL (ref 0–1.2)
BUN SERPL-MCNC: 13 MG/DL (ref 8–27)
BUN/CREAT SERPL: 15 (ref 12–28)
CALCIUM SERPL-MCNC: 10.2 MG/DL (ref 8.7–10.3)
CHLORIDE SERPL-SCNC: 99 MMOL/L (ref 96–106)
CO2 SERPL-SCNC: 27 MMOL/L (ref 20–29)
CREAT SERPL-MCNC: 0.89 MG/DL (ref 0.57–1)
EOSINOPHIL # BLD AUTO: 0.2 X10E3/UL (ref 0–0.4)
EOSINOPHIL NFR BLD AUTO: 2 %
ERYTHROCYTE [DISTWIDTH] IN BLOOD BY AUTOMATED COUNT: 15.1 % (ref 11.7–15.4)
GLOBULIN SER CALC-MCNC: 2.7 G/DL (ref 1.5–4.5)
GLUCOSE SERPL-MCNC: 100 MG/DL (ref 65–99)
HCT VFR BLD AUTO: 36 % (ref 34–46.6)
HGB BLD-MCNC: 12.2 G/DL (ref 11.1–15.9)
IMM GRANULOCYTES # BLD AUTO: 0 X10E3/UL (ref 0–0.1)
IMM GRANULOCYTES NFR BLD AUTO: 0 %
LDH SERPL-CCNC: 195 IU/L (ref 119–226)
LYMPHOCYTES # BLD AUTO: 2.6 X10E3/UL (ref 0.7–3.1)
LYMPHOCYTES NFR BLD AUTO: 31 %
MCH RBC QN AUTO: 24.3 PG (ref 26.6–33)
MCHC RBC AUTO-ENTMCNC: 33.9 G/DL (ref 31.5–35.7)
MCV RBC AUTO: 72 FL (ref 79–97)
MONOCYTES # BLD AUTO: 0.5 X10E3/UL (ref 0.1–0.9)
MONOCYTES NFR BLD AUTO: 6 %
NEUTROPHILS # BLD AUTO: 4.9 X10E3/UL (ref 1.4–7)
NEUTROPHILS NFR BLD AUTO: 60 %
PLATELET # BLD AUTO: 496 X10E3/UL (ref 150–450)
POTASSIUM SERPL-SCNC: 3.7 MMOL/L (ref 3.5–5.2)
PROT SERPL-MCNC: 7.2 G/DL (ref 6–8.5)
RBC # BLD AUTO: 5.03 X10E6/UL (ref 3.77–5.28)
SODIUM SERPL-SCNC: 142 MMOL/L (ref 134–144)
WBC # BLD AUTO: 8.3 X10E3/UL (ref 3.4–10.8)

## 2020-04-03 NOTE — TELEPHONE ENCOUNTER
Received a call from [facility] ([place] [address]) center regarding pt referral.  stated p2p was needed with Dr. Slade Robins to schedule urgent apt. Called Dr. Slade Robins and provided him with information on case. He states he is happy to see her. [address] surgical office ([phone])-[id number] office was closed.      Will try again Monday

## 2020-04-06 ENCOUNTER — HOSPITAL ENCOUNTER (OUTPATIENT)
Dept: GENERAL RADIOLOGY | Age: 65
Discharge: HOME | End: 2020-04-06
Attending: RADIOLOGY
Payer: MEDICARE

## 2020-04-06 ENCOUNTER — HOSPITAL ENCOUNTER (OUTPATIENT)
Dept: MRI IMAGING | Age: 65
Discharge: HOME | End: 2020-04-06
Attending: INTERNAL MEDICINE
Payer: MEDICARE

## 2020-04-06 ENCOUNTER — HOSPITAL ENCOUNTER (OUTPATIENT)
Dept: ULTRASOUND IMAGING | Age: 65
Discharge: HOME | End: 2020-04-06
Attending: NURSE PRACTITIONER
Payer: MEDICARE

## 2020-04-06 VITALS
OXYGEN SATURATION: 96 % | RESPIRATION RATE: 16 BRPM | HEART RATE: 80 BPM | TEMPERATURE: 98.6 F | DIASTOLIC BLOOD PRESSURE: 68 MMHG | SYSTOLIC BLOOD PRESSURE: 134 MMHG

## 2020-04-06 DIAGNOSIS — C34.90 MALIGNANT NEOPLASM OF LUNG, UNSPECIFIED LATERALITY, UNSPECIFIED PART OF LUNG (HCC): ICD-10-CM

## 2020-04-06 PROCEDURE — 88341 IMHCHEM/IMCYTCHM EA ADD ANTB: CPT

## 2020-04-06 PROCEDURE — A9575 INJ GADOTERATE MEGLUMI 0.1ML: HCPCS | Performed by: INTERNAL MEDICINE

## 2020-04-06 PROCEDURE — 88305 TISSUE EXAM BY PATHOLOGIST: CPT

## 2020-04-06 PROCEDURE — 88112 CYTOPATH CELL ENHANCE TECH: CPT

## 2020-04-06 PROCEDURE — 88342 IMHCHEM/IMCYTCHM 1ST ANTB: CPT

## 2020-04-06 PROCEDURE — 88360 TUMOR IMMUNOHISTOCHEM/MANUAL: CPT

## 2020-04-06 PROCEDURE — 71045 X-RAY EXAM CHEST 1 VIEW: CPT

## 2020-04-06 PROCEDURE — 70553 MRI BRAIN STEM W/O & W/DYE: CPT

## 2020-04-06 PROCEDURE — 74011250636 HC RX REV CODE- 250/636: Performed by: RADIOLOGY

## 2020-04-06 PROCEDURE — 32555 ASPIRATE PLEURA W/ IMAGING: CPT

## 2020-04-06 PROCEDURE — 88313 SPECIAL STAINS GROUP 2: CPT

## 2020-04-06 PROCEDURE — 74011250636 HC RX REV CODE- 250/636: Performed by: INTERNAL MEDICINE

## 2020-04-06 PROCEDURE — 88377 M/PHMTRC ALYS ISHQUANT/SEMIQ: CPT

## 2020-04-06 RX ORDER — SODIUM CHLORIDE 0.9 % (FLUSH) 0.9 %
SYRINGE (ML) INJECTION
Status: DISCONTINUED
Start: 2020-04-06 | End: 2020-04-07 | Stop reason: HOSPADM

## 2020-04-06 RX ORDER — GADOTERATE MEGLUMINE 376.9 MG/ML
15 INJECTION INTRAVENOUS
Status: COMPLETED | OUTPATIENT
Start: 2020-04-06 | End: 2020-04-06

## 2020-04-06 RX ORDER — SODIUM BICARBONATE 42 MG/ML
2 INJECTION, SOLUTION INTRAVENOUS
Status: DISCONTINUED | OUTPATIENT
Start: 2020-04-06 | End: 2020-04-07 | Stop reason: HOSPADM

## 2020-04-06 RX ORDER — SODIUM CHLORIDE 9 MG/ML
500 INJECTION, SOLUTION INTRAVENOUS ONCE
Status: COMPLETED | OUTPATIENT
Start: 2020-04-06 | End: 2020-04-06

## 2020-04-06 RX ADMIN — GADOTERATE MEGLUMINE 15 ML: 376.9 INJECTION INTRAVENOUS at 15:24

## 2020-04-06 RX ADMIN — SODIUM CHLORIDE 500 ML: 900 INJECTION, SOLUTION INTRAVENOUS at 14:17

## 2020-04-06 NOTE — PROGRESS NOTES
Patient here with sister for right thoracentesis. All assessments completed and consent obtained. Approximately 20 minutes after start of procedure, patient reports she feels light headed and feels she is going to pass out. HR, O2 sat, B/P dropping,Placed in O2 4 l. Dr. Mary May stopped procedure, catheter removed, dressing applied and patient placed in trendelenburg. VS improved and patient reports feeling better. PIV started and IVF given. CXR done post thoracentesis, results pending.

## 2020-04-06 NOTE — TELEPHONE ENCOUNTER
Called [facility] to let them know I had completed p2p with Dr. Megan Rick and he is OK seeing patient. PSR said they will contact pt for scheduling.

## 2020-04-06 NOTE — DISCHARGE INSTRUCTIONS
Patient Education        Thoracentesis: What to Expect at Home  Your Recovery  Thoracentesis (say \"nshz-[id number]-jdk-FEDERICA-sis\") is a procedure to remove fluid from the space between the lungs and the chest wall (pleural space). This procedure may also be called a \"chest tap. \" It is normal to have a small amount of fluid in the pleural space. But too much fluid can build up because of problems such as infection, heart failure, or lung cancer. The procedure may have been done to help with shortness of breath and pain caused by the fluid buildup. Or you may have had this procedure so the doctor could test the fluid to find the cause of the buildup. Your chest may be sore where the doctor put the needle or catheter into your skin (the puncture site). This usually gets better after a day or two. You can go back to work or your normal activities as soon as you feel up to it. If the doctor sent the fluid to a lab for testing, it may take several days to get the results. The doctor or nurse will discuss the results with you. This care sheet gives you a general idea about how long it will take for you to recover. But each person recovers at a different pace. Follow the steps below to feel better as quickly as possible. How can you care for yourself at home? Activity    · Rest when you feel tired. Getting enough sleep will help you recover.     · Avoid strenuous activities, such as bicycle riding, jogging, weight lifting, or aerobic exercise, until your doctor says it is okay.     · You may shower. Do not take a bath until the puncture site has healed, or until your doctor tells you it is okay.     · Ask your doctor when you can drive again.     · You may need to take 1 or 2 days off from work. It depends on the type of work you do and how you feel. Diet    · You can eat your normal diet.     · Drink plenty of fluids (unless your doctor tells you not to).    Medicines    · Your doctor will tell you if and when you can restart your medicines. He or she will also give you instructions about taking any new medicines.     · If you take aspirin or some other blood thinner, ask your doctor if and when to start taking it again. Make sure that you understand exactly what your doctor wants you to do.     · Be safe with medicines. Take pain medicines exactly as directed. ? If the doctor gave you a prescription medicine for pain, take it as prescribed. ? If you are not taking a prescription pain medicine, ask your doctor if you can take an over-the-counter medicine. ? Do not take two or more pain medicines at the same time unless the doctor told you to. Many pain medicines have acetaminophen, which is Tylenol. Too much acetaminophen (Tylenol) can be harmful.     · If you think your pain medicine is making you sick to your stomach:  ? Take your medicine after meals (unless your doctor has told you not to). ? Ask your doctor for a different pain medicine.     · If your doctor prescribed antibiotics, take them as directed. Do not stop taking them just because you feel better. You need to take the full course of antibiotics.    Care of the puncture site    · Wash the area daily with warm, soapy water, and pat it dry. Don't use hydrogen peroxide or alcohol, which may delay healing. You may cover the area with a gauze bandage if it weeps or rubs against clothing. Change the bandage every day.     · Keep the area clean and dry. Follow-up care is a key part of your treatment and safety. Be sure to make and go to all appointments, and call your doctor if you are having problems. It's also a good idea to know your test results and keep a list of the medicines you take. When should you call for help? Call 911 anytime you think you may need emergency care.  For example, call if:    · You passed out (lost consciousness).     · You have severe trouble breathing.     · You have sudden chest pain and shortness of breath, or you cough up blood.    Call your doctor now or seek immediate medical care if:    · You have shortness of breath that is new or getting worse.     · You have new or worse pain in your chest, especially when you take a deep breath.     · You are sick to your stomach or cannot keep fluids down.     · You have a fever over 100°F.     · Bright red blood has soaked through the bandage over your puncture site.     · You have signs of infection, such as:  ? Increased pain, swelling, warmth, or redness. ? Red streaks leading from the puncture site. ? Pus draining from the puncture site. ? Swollen lymph nodes in your neck, armpits, or groin. ? A fever.     · You cough up a lot more mucus than normal, or your mucus changes color.    Watch closely for changes in your health, and be sure to contact your doctor if you have any problems. Where can you learn more? Go to http://evette-diane.info/  Enter Q755 in the search box to learn more about \"Thoracentesis: What to Expect at Home. \"  Current as of: June 9, 2019Content Version: 12.4  © 3847-0987 [employer]. Care instructions adapted under license by [employer] (which disclaims liability or warranty for this information). If you have questions about a medical condition or this instruction, always ask your healthcare professional. [place] [address] any warranty or liability for your use of this information.

## 2020-04-13 ENCOUNTER — PATIENT OUTREACH (OUTPATIENT)
Dept: CASE MANAGEMENT | Age: 65
End: 2020-04-13

## 2020-04-13 NOTE — PROGRESS NOTES
4/13/20 saw pt today with Dr. Divya Shahid for initial medical oncology consult for probable primary lung cancer. She just recently moved here from [place] to be closer to family. We do not have a definitive pathology yet. She is scheduled to see Dr. Heriberto Davis 4/14 for bx. Refilled toradol for pain. Did discuss that this was stage 4 cancer. Will have pt follow up 4/24 to review bx results and discuss tx plan. Palliative care will see pt that day also. Provided opportunity to ask questions and all were discussed. Encouraged to call with any concerns. Navigation will continue to follow.

## 2020-04-18 ENCOUNTER — APPOINTMENT (OUTPATIENT)
Dept: GENERAL RADIOLOGY | Age: 65
End: 2020-04-18
Attending: EMERGENCY MEDICINE
Payer: COMMERCIAL

## 2020-04-18 ENCOUNTER — HOSPITAL ENCOUNTER (EMERGENCY)
Age: 65
Discharge: HOME | End: 2020-04-18
Attending: EMERGENCY MEDICINE
Payer: COMMERCIAL

## 2020-04-18 VITALS
HEIGHT: 66 IN | DIASTOLIC BLOOD PRESSURE: 60 MMHG | SYSTOLIC BLOOD PRESSURE: 109 MMHG | TEMPERATURE: 99.1 F | OXYGEN SATURATION: 98 % | RESPIRATION RATE: 20 BRPM | HEART RATE: 79 BPM | BODY MASS INDEX: 28.28 KG/M2 | WEIGHT: 176 LBS

## 2020-04-18 DIAGNOSIS — J90 PLEURAL EFFUSION: Primary | ICD-10-CM

## 2020-04-18 DIAGNOSIS — G89.3 CANCER ASSOCIATED PAIN: ICD-10-CM

## 2020-04-18 DIAGNOSIS — C34.90 MALIGNANT NEOPLASM OF LUNG, UNSPECIFIED LATERALITY, UNSPECIFIED PART OF LUNG (HCC): ICD-10-CM

## 2020-04-18 LAB
ANION GAP SERPL CALC-SCNC: 7 MMOL/L (ref 7–16)
BASOPHILS # BLD: 0.1 K/UL (ref 0–0.2)
BASOPHILS NFR BLD: 1 % (ref 0–2)
BUN SERPL-MCNC: 11 MG/DL (ref 8–23)
CALCIUM SERPL-MCNC: 10 MG/DL (ref 8.3–10.4)
CHLORIDE SERPL-SCNC: 102 MMOL/L (ref 98–107)
CO2 SERPL-SCNC: 31 MMOL/L (ref 21–32)
CREAT SERPL-MCNC: 1.1 MG/DL (ref 0.6–1)
DIFFERENTIAL METHOD BLD: ABNORMAL
EOSINOPHIL # BLD: 0.2 K/UL (ref 0–0.8)
EOSINOPHIL NFR BLD: 2 % (ref 0.5–7.8)
ERYTHROCYTE [DISTWIDTH] IN BLOOD BY AUTOMATED COUNT: 16.2 % (ref 11.9–14.6)
GLUCOSE SERPL-MCNC: 114 MG/DL (ref 65–100)
HCT VFR BLD AUTO: 38.1 % (ref 35.8–46.3)
HGB BLD-MCNC: 11.9 G/DL (ref 11.7–15.4)
IMM GRANULOCYTES # BLD AUTO: 0.1 K/UL (ref 0–0.5)
IMM GRANULOCYTES NFR BLD AUTO: 0 % (ref 0–5)
INR PPP: 1
LYMPHOCYTES # BLD: 2.3 K/UL (ref 0.5–4.6)
LYMPHOCYTES NFR BLD: 20 % (ref 13–44)
MCH RBC QN AUTO: 23.8 PG (ref 26.1–32.9)
MCHC RBC AUTO-ENTMCNC: 31.2 G/DL (ref 31.4–35)
MCV RBC AUTO: 76.4 FL (ref 79.6–97.8)
MONOCYTES # BLD: 0.7 K/UL (ref 0.1–1.3)
MONOCYTES NFR BLD: 6 % (ref 4–12)
NEUTS SEG # BLD: 8.7 K/UL (ref 1.7–8.2)
NEUTS SEG NFR BLD: 72 % (ref 43–78)
NRBC # BLD: 0 K/UL (ref 0–0.2)
PLATELET # BLD AUTO: 434 K/UL (ref 150–450)
PMV BLD AUTO: 10.2 FL (ref 9.4–12.3)
POTASSIUM SERPL-SCNC: 3.1 MMOL/L (ref 3.5–5.1)
PROTHROMBIN TIME: 13.5 SEC (ref 12–14.7)
RBC # BLD AUTO: 4.99 M/UL (ref 4.05–5.2)
SODIUM SERPL-SCNC: 140 MMOL/L (ref 136–145)
WBC # BLD AUTO: 12 K/UL (ref 4.3–11.1)

## 2020-04-18 PROCEDURE — 74011250636 HC RX REV CODE- 250/636: Performed by: EMERGENCY MEDICINE

## 2020-04-18 PROCEDURE — 85025 COMPLETE CBC W/AUTO DIFF WBC: CPT

## 2020-04-18 PROCEDURE — 76604 US EXAM CHEST: CPT | Performed by: INTERNAL MEDICINE

## 2020-04-18 PROCEDURE — 80048 BASIC METABOLIC PNL TOTAL CA: CPT

## 2020-04-18 PROCEDURE — 76604 US EXAM CHEST: CPT

## 2020-04-18 PROCEDURE — 85610 PROTHROMBIN TIME: CPT

## 2020-04-18 PROCEDURE — 96374 THER/PROPH/DIAG INJ IV PUSH: CPT

## 2020-04-18 PROCEDURE — 71046 X-RAY EXAM CHEST 2 VIEWS: CPT

## 2020-04-18 PROCEDURE — 99284 EMERGENCY DEPT VISIT MOD MDM: CPT | Performed by: INTERNAL MEDICINE

## 2020-04-18 PROCEDURE — 99284 EMERGENCY DEPT VISIT MOD MDM: CPT

## 2020-04-18 RX ORDER — MORPHINE SULFATE 4 MG/ML
4 INJECTION INTRAVENOUS
Status: DISCONTINUED | OUTPATIENT
Start: 2020-04-18 | End: 2020-04-18

## 2020-04-18 RX ORDER — FENTANYL CITRATE 50 UG/ML
25 INJECTION, SOLUTION INTRAMUSCULAR; INTRAVENOUS
Status: COMPLETED | OUTPATIENT
Start: 2020-04-18 | End: 2020-04-18

## 2020-04-18 RX ORDER — TRAMADOL HYDROCHLORIDE 50 MG/1
50 TABLET ORAL
Qty: 12 TAB | Refills: 0 | Status: SHIPPED | OUTPATIENT
Start: 2020-04-18 | End: 2020-04-21

## 2020-04-18 RX ADMIN — FENTANYL CITRATE 25 MCG: 50 INJECTION, SOLUTION INTRAMUSCULAR; INTRAVENOUS at 14:48

## 2020-04-18 NOTE — ED PROVIDER NOTES
Presents with complaint of right chest wall pain in the back after coughing episode. Patient states she has been feeling short of breath secondary to a pleural effusion. She has been taking Tylenol for pain and has had no relief. She refuses to sit up because of pain. She denies any fever nausea vomiting or productive cough. She has a history of lung cancer and is supposed to have a thoracentesis on Monday. She has not taken Eliquis since yesterday. The history is provided by the patient. Rib Pain   This is a new problem. The problem occurs continuously. Episode onset: 9 AM. The problem has not changed since onset. Associated symptoms include cough. Pertinent negatives include no fever, no sputum production, no wheezing, no chest pain and no leg swelling. She has tried nothing for the symptoms. Associated medical issues include chronic lung disease.         Past Medical History:   Diagnosis Date    Benign essential hypertension 2020    Diagnosed in her 28's    Complete hearing loss of right ear 2020    1982, s/p trauma w/ resultant surgery ending in complete hearing loss    Postoperative hypothyroidism 2020    S/p total thyroidectomy  for nodular/cystic goiter, benign    S/P total thyroidectomy 2020    Surgery  for Multinodular Goiter, Cystic masses, Benign       Past Surgical History:   Procedure Laterality Date    HX BREAST BIOPSY Left     Benign Cyst Removed    HX  SECTION  ,     HX COLONOSCOPY  2015    Normal done in [place], repeat in 5 yrs due to family hx    HX CYST REMOVAL      Skin on back of neck    HX HEENT      Right Ear Surgery, Traumatic Scars    HX PARTIAL HYSTERECTOMY  in her 42's    Uterine Fibroids, Left Ovarian Cysts-Left Oopherectomy    HX THYROIDECTOMY      Cystic Goiter    IR FINE NEEDLE ASPIRATION THYROID  2013    Benign Cysts         Family History:   Problem Relation Age of Onset    Lung Cancer Mother  age 68, former smoker    Breast Cancer Mother         diagnosed w/ breast cancer in her late 50's/early 63's, surgery, survivor     Hypertension Mother     Prostate Cancer Father          in his early [de-identified] Hypertension Sister     Diabetes Sister     Breast Cancer Sister         diagnosed in her early 42's, treated w/ radiation and surgery    Prostate Cancer Brother         survivor    Colon Cancer Brother         diagnosed in his 52's    Asthma Son     No Known Problems Daughter     Hypertension Sister     High Cholesterol Sister     Hypertension Brother     Obesity Brother     No Known Problems Brother     Heart Disease Brother        Social History     Socioeconomic History    Marital status:      Spouse name: Not on file    Number of children: 2    Years of education: Not on file    Highest education level: Not on file   Occupational History    Occupation: Contractor/ for the [employer]   Social Needs    Financial resource strain: Not on file    Food insecurity     Worry: Not on file     Inability: Not on file   [employer] needs     Medical: Not on file     Non-medical: Not on file   Tobacco Use    Smoking status: Never Smoker    Smokeless tobacco: Never Used   Substance and Sexual Activity    Alcohol use: Yes     Comment: occsaional glass of wine    Drug use: Never    Sexual activity: Yes   Lifestyle    Physical activity     Days per week: Not on file     Minutes per session: Not on file    Stress: Not on file   Relationships    Social connections     Talks on phone: Not on file     Gets together: Not on file     Attends Advent service: Not on file     Active member of club or organization: Not on file     Attends meetings of clubs or organizations: Not on file     Relationship status: Not on file    Intimate partner violence     Fear of current or ex partner: Not on file     Emotionally abused: Not on file Physically abused: Not on file     Forced sexual activity: Not on file   Other Topics Concern    Not on file   Social History Narrative    Native of [place] where all of her family still resides. 1 son in [place], but 1 daughter in [facility] [address] [place]. 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 Lives in [facility] near [place]. ALLERGIES: Sulfa (sulfonamide antibiotics) and Symbicort [budesonide-formoterol]    Review of Systems   Constitutional: Negative for chills and fever. Respiratory: Positive for cough. Negative for sputum production and wheezing. Cardiovascular: Negative for chest pain and leg swelling. All other systems reviewed and are negative. Vitals:    04/18/20 1343   BP: 113/62   Pulse: 81   Resp: 20   Temp: 99.1 °F (37.3 °C)   SpO2: 95%   Weight: 79.8 kg (176 lb)   Height: 5' 6\" (1.676 m)            Physical Exam  Vitals signs and nursing note reviewed. Constitutional:       General: She is in acute distress (Appears in pain). Appearance: Normal appearance. She is not ill-appearing. HENT:      Head: Normocephalic and atraumatic. Mouth/Throat:      Mouth: Mucous membranes are moist.   Neck:      Musculoskeletal: Normal range of motion and neck supple. Cardiovascular:      Rate and Rhythm: Normal rate and regular rhythm. Pulmonary:      Effort: Respiratory distress (splinting secondary to pain) present. Chest:      Chest wall: Tenderness (no crepitus) present. Musculoskeletal: Normal range of motion. General: No swelling or tenderness. Skin:     General: Skin is warm and dry. Neurological:      General: No focal deficit present. Mental Status: She is alert and oriented to person, place, and time.    Psychiatric:         Mood and Affect: Mood normal.         Behavior: Behavior normal.          MDM  Number of Diagnoses or Management Options  Diagnosis management comments: She refused to have thoracentesis because she only stopped take her Eliquis yesterday. Seen by Dr. Mary Gurrola. I think this is the cause of her pain. I offered to admit her but she declined. She tolerated fentanyl fine. Will attempt tramadol for pain until Monday. Tylenol and occasional motrin 200 also okay.          Amount and/or Complexity of Data Reviewed  Clinical lab tests: ordered and reviewed  Discuss the patient with other providers: yes (Dr. Mary Gurrola)    Risk of Complications, Morbidity, and/or Mortality  Presenting problems: moderate  Diagnostic procedures: moderate  Management options: moderate    Patient Progress  Patient progress: improved         Procedures

## 2020-04-18 NOTE — ED TRIAGE NOTES
Patient arrives with EMS wearing a mask. EMS called out lung pain. States coughed and had a pop on the right side of ribs. Pain with movement. Has lung CA. Supposed to have a thoracentesis on Monday.

## 2020-04-18 NOTE — CONSULTS
CONSULT NOTE    Alexys Diaz    4/18/2020    Date of Admission:  4/18/2020    The patient's chart is reviewed and the patient is discussed with the staff. Subjective:     Patient is a 71 y.o.  female seen and evaluated at the request of Dr. Dylan Zamudio. patient with lung CA with mets with large effusion and was to have it drained on Monday came in today on Saturday since went to restroom with a POP sound and worse pain in Right chest. cxr today noted larger effusion on Right side. Does take blood thinners Eliuis and took it last night. Not on oxygen, on RA. Uncomfortable. No falls. No dyspnea      Review of Systems:  -Fever  -Headaches  -Chest pain +right chest pain  -Dyspnea, -wheezing, -cough  -Abdominal pain, -constipation  -Leg swelling  All other organ systems grossly normal.      Patient Active Problem List   Diagnosis Code    S/P total thyroidectomy E89.0    Benign essential hypertension I10    Complete hearing loss of right ear H91.91    Postoperative hypothyroidism E89.0    Pleural effusion J90    Lung nodules R91.8    Pelvic mass R19.00    Neck mass R22.1    Lymphadenopathy, retroperitoneal R59.0    Malignant neoplasm of lung (HCC) C34.90       [employer]  . Prior to Admission Medications   Prescriptions Last Dose Informant Patient Reported? Taking? Dyazide 37.5-25 mg per capsule   Yes No   Sig: Take 1 Cap by mouth daily. Synthroid 88 mcg tablet   Yes No   Sig: Take 1 Tab by mouth Daily (before breakfast). -Take 1 tab once a day on Mon-Thur  -Take 1/2 tab on Fri  -Skip on Sat & Sun   apixaban (ELIQUIS) 5 mg (74 tabs) starter pack   No No   Sig: Take 10 mg (two 5 mg tablets) by mouth twice a day for 7 days Followed by 5 mg (one 5 mg tablet) by mouth twice a day   ketorolac (TORADOL) 10 mg tablet   No No   Sig: Take 1 Tab by mouth every six (6) hours as needed for Pain.  Indications: excessive pain   spironolactone (ALDACTONE) 50 mg tablet   No No Sig: Take 1 Tab by mouth two (2) times a day.       Facility-Administered Medications: None       Past Medical History:   Diagnosis Date    Benign essential hypertension 2020    Diagnosed in her 28's    Complete hearing loss of right ear 2020    1982, s/p trauma w/ resultant surgery ending in complete hearing loss    Postoperative hypothyroidism 2020    S/p total thyroidectomy  for nodular/cystic goiter, benign    S/P total thyroidectomy 2020    Surgery 2015 for Multinodular Goiter, Cystic masses, Benign     Past Surgical History:   Procedure Laterality Date    HX BREAST BIOPSY Left     Benign Cyst Removed    HX  SECTION  ,     HX COLONOSCOPY  2015    Normal done in [place], repeat in 5 yrs due to family hx    HX CYST REMOVAL      Skin on back of neck    HX HEENT      Right Ear Surgery, Traumatic Scars    HX PARTIAL HYSTERECTOMY  in her 42's    Uterine Fibroids, Left Ovarian Cysts-Left Oopherectomy    HX THYROIDECTOMY      Cystic Goiter    IR FINE NEEDLE ASPIRATION THYROID  2013    Benign Cysts     Social History     Socioeconomic History    Marital status:      Spouse name: Not on file    Number of children: 2    Years of education: Not on file    Highest education level: Not on file   Occupational History    Occupation: Contractor/ for the [employer]   Social Needs    Financial resource strain: Not on file    Food insecurity     Worry: Not on file     Inability: Not on file   [employer] needs     Medical: Not on file     Non-medical: Not on file   Tobacco Use    Smoking status: Never Smoker    Smokeless tobacco: Never Used   Substance and Sexual Activity    Alcohol use: Yes     Comment: occsaional glass of wine    Drug use: Never    Sexual activity: Yes   Lifestyle    Physical activity     Days per week: Not on file     Minutes per session: Not on file    Stress: Not on file Relationships    Social connections     Talks on phone: Not on file     Gets together: Not on file     Attends Judaism service: Not on file     Active member of club or organization: Not on file     Attends meetings of clubs or organizations: Not on file     Relationship status: Not on file    Intimate partner violence     Fear of current or ex partner: Not on file     Emotionally abused: Not on file     Physically abused: Not on file     Forced sexual activity: Not on file   Other Topics Concern    Not on file   Social History Narrative    Native of [address] [place] [address] where all of her family still resides. 1 son in [address] [place] [address], but 1 daughter in [address]. 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 Lives in [facility] near [place].       Family History   Problem Relation Age of Onset    Lung Cancer Mother          age 68, former smoker    Breast Cancer Mother         diagnosed w/ breast cancer in her late 50's/early 63's, surgery, survivor     Hypertension Mother     Prostate Cancer Father          in his early [de-identified] Hypertension Sister     Diabetes Sister     Breast Cancer Sister         diagnosed in her early 42's, treated w/ radiation and surgery    Prostate Cancer Brother         survivor    Colon Cancer Brother         diagnosed in his 52's    Asthma Son     No Known Problems Daughter     Hypertension Sister     High Cholesterol Sister     Hypertension Brother     Obesity Brother     No Known Problems Brother     Heart Disease Brother      Allergies   Allergen Reactions    Sulfa (Sulfonamide Antibiotics) Swelling     Facial swelling     Oxycodone Other (comments)     Hallucinations    Morphine Other (comments)     Hallucinations    Symbicort [Budesonide-Formoterol] Other (comments)     Hyper, insomnia, nightmares       No current facility-administered medications for this encounter. Current Outpatient Medications   Medication Sig    ketorolac (TORADOL) 10 mg tablet Take 1 Tab by mouth every six (6) hours as needed for Pain. Indications: excessive pain    apixaban (ELIQUIS) 5 mg (74 tabs) starter pack Take 10 mg (two 5 mg tablets) by mouth twice a day for 7 days Followed by 5 mg (one 5 mg tablet) by mouth twice a day    Dyazide 37.5-25 mg per capsule Take 1 Cap by mouth daily.  spironolactone (ALDACTONE) 50 mg tablet Take 1 Tab by mouth two (2) times a day.  Synthroid 88 mcg tablet Take 1 Tab by mouth Daily (before breakfast). -Take 1 tab once a day on Mon-Thur  -Take 1/2 tab on Fri  -Skip on Sat & Sun         Objective:     Vitals:    04/18/20 1343 04/18/20 1443 04/18/20 1446 04/18/20 1450   BP: 113/62 142/78     Pulse: 81  78    Resp: 20      Temp: 99.1 °F (37.3 °C)      SpO2: 95%  95% 95%   Weight: 176 lb (79.8 kg)      Height: 5' 6\" (1.676 m)          PHYSICAL EXAM     Constitutional:  the patient is well developed and look uncomfortable  EENMT:  Sclera clear, pupils equal, oral mucosa moist  Respiratory: decreased sounds in right chest.   Cardiovascular:  RRR without M,G,R  Gastrointestinal: soft and non-tender; with positive bowel sounds. Musculoskeletal: warm without cyanosis. There is no lower extremity edema. Skin:  no jaundice or rashes, no wounds   Neurologic: no gross neuro deficits     Psychiatric:  alert and anxious    CXR:  Moderate right sided effusion larger than prior x-ray in chart      Recent Labs     04/18/20  1442   WBC 12.0*   HGB 11.9   HCT 38.1      INR 1.0     Recent Labs     04/18/20  1442      K 3.1*      *   CO2 31   BUN 11   CREA 1.10*   CA 10.0     No results for input(s): PH, PCO2, PO2, HCO3, PHI, PCO2I, PO2I, HCO3I in the last 72 hours. No results for input(s): LCAD, LAC in the last 72 hours.     Assessment:  (Medical Decision Making)     Hospital Problems  Date Reviewed: 4/14/2020          Codes Class Noted POA    * (Principal) Malignant neoplasm of lung [facility]) ICD-10-CM: C34.90  ICD-9-CM: 162.9  4/2/2020 Yes        Pleural effusion ICD-10-CM: J90  ICD-9-CM: 511.9  4/2/2020 Yes              Plan:  (Medical Decision Making)     --hold off tap for now -- she is afraid of bleeding since took her eliquis last night. On RA and should be ok with pain meds and will tap her on Monday. Told if worse sooner can drain but risk vs benefits. She is aware and understands. Currently does not need oxygen per ER. More than 50% of the time documented was spent in face-to-face contact with the patient and in the care of the patient on the floor/unit where the patient is located. Thank you very much for this referral.  We appreciate the opportunity to participate in this patient's care. Will follow along with above stated plan.     Melinda Yoder MD

## 2020-04-18 NOTE — PROCEDURES
Summary:    After informed consent was obtained, the patient was positioned upright in the usual fashion.  Ultrasound was used to identify the optimal spot for pleural drainage. Moderate to large effusion noted in right chest. No attempt made to drain since patient does not want to take risk of bleeding since on blood thinner and took it last night. She will come back on Monday as scheduled and also told if worse then to come back and drain. She agrees to this plan and is aware of the risks.     EBL --none    Patient stable post procedure    No samples sent.     Signed By: Lawyer Avani MD

## 2020-04-18 NOTE — DISCHARGE INSTRUCTIONS
Patient Education        Learning About Cancer Pain  What is cancer pain? Cancer pain may be caused by the cancer or by the treatments and tests used. The pain may make it hard for you to do your normal activities, such as sleeping or eating. Over time, cancer pain can cause appetite and sleep problems, isolation, and depression. But most cancer pain can be managed with medicines and other methods. This may not mean that you have no pain but that it stays at a level that you can bear. Treating your pain will make you feel better. You will be more active, eat and sleep better, and enjoy your family and friends. What are some key points about cancer pain? · You are the only person who can say how much pain you have. If you tell your doctor when you have pain or when pain changes, your doctor can help you. · Cancer pain can almost always be relieved if you work with your doctor to create a treatment plan that is right for you. · Pain is often easier to control right after it starts instead of waiting until it becomes bad. · Take your medicines exactly as prescribed. Call your doctor if you think you are having a problem with your medicine. · You may find that taking your medicine works most of the time, but your pain flares up during extra activity or for no clear reason. This is called breakthrough pain. Ask your doctor what you can do if this happens. Your doctor can give you a prescription for fast-acting medicines that you can take for breakthrough pain. · People who take opioid pain medicines for cancer pain rarely develop opioid use disorder. Moderate to severe opioid use disorder is sometimes called addiction. Your body may come to expect daily doses of medicine to control the pain. But your doctor can gradually lower the amount you are taking when and if the cause of your pain is gone. What treatments can help you manage cancer pain?   Medical treatments to manage cancer pain include:  · Prescription and over-the-counter medicines. Many types of medicines are used. Your doctor may suggest different combinations of medicines. · Surgery, chemotherapy, radiation, and hormone therapy. These may be used to remove or destroy a tumor that is causing pain. · Nerve treatments. These are used to help with nerve pain. They include:  ? A nerve block that uses medicine injected into or near the nerve. ? A nerve surgery that cuts the nerve to stop the pain. ? Other treatments, such as injecting a chemical or using heat or cold, to destroy the nerve and stop the pain. Nonmedical treatments include:  · Physical therapy, gentle massage, acupuncture, and heat or cold to ease pain. · Stretching, yoga, and exercises to help you keep your strength, flexibility, and mobility. · Relaxation, biofeedback, or meditation to relieve stress and anxiety. · Short-term crisis therapy with a counselor. This may help you manage your cancer pain or the discomfort from cancer treatments. · Education and emotional support. Learning as much as you can about your pain may help. So can sharing your feelings with others. A support group can be a safe and comfortable place to talk about your illness. How can you manage cancer pain? Your doctor needs all the information you can give about what your pain feels like. It often helps to write things down in a pain diary. · Write down when your pain starts, what it feels like, and how long it lasts. Use words like dull, aching, sharp, shooting, throbbing, or burning. · Note changes in your pain. Is it constant, or does it come and go? Do you have more than one kind of pain? How long does it last?  · Rate your pain on a scale of 0 to 10, with 0 being no pain and 10 being the worst pain you can imagine. · Write exactly where you feel pain. You can use a drawing. Say whether the pain is just in that one place or several places at once.  Or tell your doctor if it travels from one place to another. · Write down what makes your pain better or worse. Note when you used a treatment, how well it worked, and any side effects. If you and your doctor are not able to control your pain, ask about seeing a pain specialist. A pain specialist is a health professional who focuses on treating resistant pain. Talk to your doctor if you are having problems with depression. Treating depression can make it easier to manage your cancer pain. Where can you learn more? Go to http://evette-diane.info/  Enter K531 in the search box to learn more about \"Learning About Cancer Pain. \"  Current as of: August 21, 2019Content Version: 12.4  © 2667-7332 [employer]. Care instructions adapted under license by [employer] (which disclaims liability or warranty for this information). If you have questions about a medical condition or this instruction, always ask your healthcare professional. [place] [address] any warranty or liability for your use of this information. Patient Education        Learning About a Pleural Effusion  What is it? A pleural effusion (say \"PLER-rolf of-FSNO-jgcv\") is the buildup of fluid in the space between tissues lining the lungs and the chest wall. Because of the fluid buildup, the lungs may not be able to expand completely. This can make it hard to breathe. A pleural empyema (say \"ua-zq-TD-muh\") is a problem that can happen with pleural effusion. Bacteria or other infections cause pus to form in the pleural fluid. But most pleural effusions don't become infected. What causes it? A pleural effusion has many causes. They include pneumonia, cancer, inflammation of the tissues around the lungs, and heart failure. What are the symptoms? Symptoms of a pleural effusion may include:  · Trouble breathing. · Shortness of breath. · Chest pain. · Fever. · A cough. A minor pleural effusion may not cause any symptoms.   How is it diagnosed? A pleural effusion is usually diagnosed with an X-ray and a physical exam. The doctor listens to the airflow in your lungs. How is it treated? A pleural effusion can be treated by removing fluid from the space between the tissues around the lungs. This is done with a needle that's put into the chest (thoracentesis). A small amount of the fluid may be sent to a lab to find out what is causing the buildup of fluid. Removing the fluid may help to relieve symptoms, such as shortness of breath and chest pain. It can help the lungs to expand more fully. If the pleural effusion doesn't get better, a catheter may be placed in the chest. This is a flexible tube that allows fluid to drain from the lungs. The catheter stays in the chest until the doctor removes it. Some people may get a treatment that removes the fluid and then puts a medicine into the chest cavity. This helps to prevent too much fluid from building up again. A minor pleural effusion often goes away on its own. Doctors may need to treat the condition that is causing the pleural effusion. For example, you may get medicines to treat pneumonia or congestive heart failure. When the condition is treated, the effusion usually goes away. For a pleural empyema, the pus needs to be drained. It may drain from a flexible tube placed in the chest. Or you may have surgery to drain it. You also will get antibiotics. Follow-up care is a key part of your treatment and safety. Be sure to make and go to all appointments, and call your doctor if you are having problems. It's also a good idea to know your test results and keep a list of the medicines you take. Where can you learn more? Go to http://evette-diane.info/  Enter A920 in the search box to learn more about \"Learning About a Pleural Effusion. \"  Current as of: June 9, 2019Content Version: 12.4  © 7587-8069 [employer].   Care instructions adapted under license by [employer] (which disclaims liability or warranty for this information). If you have questions about a medical condition or this instruction, always ask your healthcare professional. [address] any warranty or liability for your use of this information.

## 2020-04-18 NOTE — ED NOTES
I have reviewed discharge instructions with the patient. The patient verbalized understanding. Patient left ED via Discharge Method: wheelchair to Home with sister  Opportunity for questions and clarification provided. Patient given 1 scripts. No esign        To continue your aftercare when you leave the hospital, you may receive an automated call from our care team to check in on how you are doing. This is a free service and part of our promise to provide the best care and service to meet your aftercare needs.  If you have questions, or wish to unsubscribe from this service please call [phone]. Thank you for Choosing our [facility] Emergency Department.

## 2020-04-20 ENCOUNTER — HOSPITAL ENCOUNTER (OUTPATIENT)
Age: 65
Setting detail: OUTPATIENT SURGERY
Discharge: HOME | End: 2020-04-20
Attending: INTERNAL MEDICINE | Admitting: INTERNAL MEDICINE
Payer: MEDICARE

## 2020-04-20 VITALS
SYSTOLIC BLOOD PRESSURE: 147 MMHG | HEART RATE: 73 BPM | WEIGHT: 176 LBS | TEMPERATURE: 98.7 F | HEIGHT: 66 IN | OXYGEN SATURATION: 97 % | BODY MASS INDEX: 28.28 KG/M2 | DIASTOLIC BLOOD PRESSURE: 72 MMHG | RESPIRATION RATE: 18 BRPM

## 2020-04-20 DIAGNOSIS — J91.0 MALIGNANT PLEURAL EFFUSION: ICD-10-CM

## 2020-04-20 DIAGNOSIS — C34.90 MALIGNANT NEOPLASM OF LUNG, UNSPECIFIED LATERALITY, UNSPECIFIED PART OF LUNG (HCC): ICD-10-CM

## 2020-04-20 PROCEDURE — 77030014147 HC TY THORCENT PARA TELE -B: Performed by: INTERNAL MEDICINE

## 2020-04-20 PROCEDURE — 88305 TISSUE EXAM BY PATHOLOGIST: CPT

## 2020-04-20 PROCEDURE — 76040000007: Performed by: INTERNAL MEDICINE

## 2020-04-20 PROCEDURE — 32555 ASPIRATE PLEURA W/ IMAGING: CPT | Performed by: INTERNAL MEDICINE

## 2020-04-20 PROCEDURE — 76604 US EXAM CHEST: CPT | Performed by: INTERNAL MEDICINE

## 2020-04-20 PROCEDURE — 99213 OFFICE O/P EST LOW 20 MIN: CPT | Performed by: INTERNAL MEDICINE

## 2020-04-20 PROCEDURE — 88112 CYTOPATH CELL ENHANCE TECH: CPT

## 2020-04-20 NOTE — H&P
HISTORY AND PHYSICAL      Allan Madrid    4/20/2020    Date of Admission:  4/20/2020    The patient's chart is reviewed and the patient is discussed with the staff. Subjective:        Patient is a 71 y.o.  female presents with malignant pleural effusion. She was diagnosed with stage IV adenocarcinoma with mets to R pleural fluid as well as multiple other sites last month while she was traveling in [facility]. She has been seen by Dr Nir Omalley with oncology and Dr Norm Shaffer with plans to perform cervical lymph node resection for additional tissue before beginning therapy. She reports R back pain as her main symptom. This was some better after her original thoracentesis in early March in [place] during which 600cc was removed. She reports hypotension during procedure that limited fluid removal, however She presents today with worsening R back pain and the hopes of additional thoracentesis. Review of Systems  A comprehensive review of systems was negative except for that written in the HPI. Patient Active Problem List   Diagnosis Code    S/P total thyroidectomy E89.0    Benign essential hypertension I10    Complete hearing loss of right ear H91.91    Postoperative hypothyroidism E89.0    Malignant pleural effusion J91.0    Lung nodules R91.8    Pelvic mass R19.00    Neck mass R22.1    Lymphadenopathy, retroperitoneal R59.0    Malignant neoplasm of lung (HCC) C34.90       Prior to Admission Medications   Prescriptions Last Dose Informant Patient Reported? Taking? Dyazide 37.5-25 mg per capsule 4/19/2020 at Unknown time  Yes Yes   Sig: Take 1 Cap by mouth daily. Synthroid 88 mcg tablet 4/20/2020 at Unknown time  Yes Yes   Sig: Take 1 Tab by mouth Daily (before breakfast).  -Take 1 tab once a day on Mon-Thur  -Take 1/2 tab on Fri  -Skip on Sat & Sun   apixaban (ELIQUIS) 5 mg (74 tabs) starter pack 4/17/2020  No No   Sig: Take 10 mg (two 5 mg tablets) by mouth twice a day for 7 days Followed by 5 mg (one 5 mg tablet) by mouth twice a day   traMADoL (Ultram) 50 mg tablet 2020 at Unknown time  No Yes   Sig: Take 1 Tab by mouth every six (6) hours as needed for Pain for up to 3 days. Max Daily Amount: 200 mg. Facility-Administered Medications: None       Past Medical History:   Diagnosis Date    Benign essential hypertension 2020    Diagnosed in her 28's    Complete hearing loss of right ear 2020    1982, s/p trauma w/ resultant surgery ending in complete hearing loss    Postoperative hypothyroidism 2020    S/p total thyroidectomy  for nodular/cystic goiter, benign    S/P total thyroidectomy 2020    Surgery 2015 for Multinodular Goiter, Cystic masses, Benign     Past Surgical History:   Procedure Laterality Date    HX BREAST BIOPSY Left     Benign Cyst Removed    HX  SECTION  ,     HX COLONOSCOPY  2015    Normal done in [facility], repeat in 5 yrs due to family hx    HX CYST REMOVAL      Skin on back of neck    HX HEENT      Right Ear Surgery, Traumatic Scars    HX PARTIAL HYSTERECTOMY  in her 41's    Uterine Fibroids, Left Ovarian Cysts-Left Oopherectomy    HX THYROIDECTOMY  2015    Cystic Goiter    IR FINE NEEDLE ASPIRATION THYROID  2013    Benign Cysts     Social History     Socioeconomic History    Marital status:      Spouse name: Not on file    Number of children: 2    Years of education: Not on file    Highest education level: Not on file   Occupational History    Occupation: Contractor/ for the [employer]   Social Needs    Financial resource strain: Not on file    Food insecurity     Worry: Not on file     Inability: Not on file   [employer] needs     Medical: Not on file     Non-medical: Not on file   Tobacco Use    Smoking status: Never Smoker    Smokeless tobacco: Never Used   Substance and Sexual Activity    Alcohol use:  Yes Comment: occsaional glass of wine    Drug use: Never    Sexual activity: Yes   Lifestyle    Physical activity     Days per week: Not on file     Minutes per session: Not on file    Stress: Not on file   Relationships    Social connections     Talks on phone: Not on file     Gets together: Not on file     Attends Bahai service: Not on file     Active member of club or organization: Not on file     Attends meetings of clubs or organizations: Not on file     Relationship status: Not on file    Intimate partner violence     Fear of current or ex partner: Not on file     Emotionally abused: Not on file     Physically abused: Not on file     Forced sexual activity: Not on file   Other Topics Concern    Not on file   Social History Narrative    Native of [place] where all of her family still resides. 1 son in [place], but 1 daughter in [address] [place] [address] [facility]. 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 Lives in [facility] near [place].       Family History   Problem Relation Age of Onset    Lung Cancer Mother          age 68, former smoker    Breast Cancer Mother         diagnosed w/ breast cancer in her late 50's/early 63's, surgery, survivor     Hypertension Mother     Prostate Cancer Father          in his early [de-identified] Hypertension Sister     Diabetes Sister     Breast Cancer Sister         diagnosed in her early 42's, treated w/ radiation and surgery    Prostate Cancer Brother         survivor    Colon Cancer Brother         diagnosed in his 52's    Asthma Son     No Known Problems Daughter     Hypertension Sister     High Cholesterol Sister     Hypertension Brother     Obesity Brother     No Known Problems Brother     Heart Disease Brother      Allergies   Allergen Reactions    Sulfa (Sulfonamide Antibiotics) Swelling     Facial swelling     Oxycodone Other (comments) Hallucinations    Morphine Other (comments)     Hallucinations    Symbicort [Budesonide-Formoterol] Other (comments)     Hyper, insomnia, nightmares       No current facility-administered medications for this encounter. Objective:     Vitals:    04/20/20 1333 04/20/20 1338 04/20/20 1340 04/20/20 1343   BP: 93/55 118/74 133/76 129/74   Pulse: 64 72 70 69   Resp: 16 16 16 16   Temp:       SpO2: 97% 96% 97% 96%   Weight:       Height:           PHYSICAL EXAM     Constitutional:  the patient is well developed and in no acute distress  EENMT:  Sclera clear, pupils equal, oral mucosa moist  Respiratory: Decreased at right base  Cardiovascular:  RRR without M,G,R  Gastrointestinal: soft and non-tender; with positive bowel sounds. Musculoskeletal: warm without cyanosis. There is no lower extremity edema. Skin:  no jaundice or rashes, no wounds   Neurologic: no gross neuro deficits     Psychiatric:  alert and oriented x ppt    CXR:  4/18/20      Recent Labs     04/18/20  1442   WBC 12.0*   HGB 11.9   HCT 38.1      INR 1.0     Recent Labs     04/18/20  1442      K 3.1*      *   CO2 31   BUN 11   CREA 1.10*   CA 10.0     No results for input(s): PH, PCO2, PO2, HCO3, PHI, PCO2I, PO2I, HCO3I in the last 72 hours. No results for input(s): LCAD, LAC in the last 72 hours. Assessment:  (Medical Decision Making)     Hospital Problems  Date Reviewed: 4/14/2020          Codes Class Noted POA    Malignant pleural effusion ICD-10-CM: J91.0  ICD-9-CM: 511.81  4/18/2020 Unknown        Malignant neoplasm of lung [facility]) ICD-10-CM: C34.90  ICD-9-CM: 162.9  4/18/2020 Yes            Pt with stage IV adenocarcinoma of lung origin with malignant pleural effusion. Worsening pain seeking repeat thoracentesis. Discussed the role of this procedure and the consideration of pleurx catheter placement if additional drainage is needed in the future. She was provided with pleurx information packet to consider. She indicates she would be interested and will check to see if her insurance will cover the necessary supplies. Plan:  (Medical Decision Making)     --Will proceed with thoracentesis, sending fluid for cytology and caris if this would be helpful to oncology. -f/u in pulmonary office within the month. More than 50% of the time documented was spent in face-to-face contact with the patient and in the care of the patient on the floor/unit where the patient is located.     Corina Rodrigues MD

## 2020-04-20 NOTE — H&P (VIEW-ONLY)
HISTORY AND PHYSICAL Gracia Madrid 4/20/2020 Date of Admission:  4/20/2020 The patient's chart is reviewed and the patient is discussed with the staff. Subjective:  
  
 
Patient is a 71 y.o.  female presents with malignant pleural effusion. She was diagnosed with stage IV adenocarcinoma with mets to R pleural fluid as well as multiple other sites last month while she was traveling in [facility]. She has been seen by Dr Leonard Pugh with oncology and Dr Jessika Miranda with plans to perform cervical lymph node resection for additional tissue before beginning therapy. She reports R back pain as her main symptom. This was some better after her original thoracentesis in early March in [place] during which 600cc was removed. She reports hypotension during procedure that limited fluid removal, however She presents today with worsening R back pain and the hopes of additional thoracentesis. Review of Systems A comprehensive review of systems was negative except for that written in the HPI. Patient Active Problem List  
Diagnosis Code  S/P total thyroidectomy E89.0  Benign essential hypertension I10  
 Complete hearing loss of right ear H91.91  
 Postoperative hypothyroidism E89.0  Malignant pleural effusion J91.0  Lung nodules R91.8  Pelvic mass R19.00  
 Neck mass R22.1  Lymphadenopathy, retroperitoneal R59.0  Malignant neoplasm of lung (HCC) C34.90 Prior to Admission Medications Prescriptions Last Dose Informant Patient Reported? Taking? Dyazide 37.5-25 mg per capsule 4/19/2020 at Unknown time  Yes Yes Sig: Take 1 Cap by mouth daily. Synthroid 88 mcg tablet 4/20/2020 at Unknown time  Yes Yes Sig: Take 1 Tab by mouth Daily (before breakfast).  -Take 1 tab once a day on Mon-Thur 
-Take 1/2 tab on Fri 
-Skip on Sat & Sun  
apixaban (ELIQUIS) 5 mg (74 tabs) starter pack 4/17/2020  No No  
 Sig: Take 10 mg (two 5 mg tablets) by mouth twice a day for 7 days Followed by 5 mg (one 5 mg tablet) by mouth twice a day  
traMADoL (Ultram) 50 mg tablet 4/20/2020 at Unknown time  No Yes Sig: Take 1 Tab by mouth every six (6) hours as needed for Pain for up to 3 days. Max Daily Amount: 200 mg. Facility-Administered Medications: None Past Medical History:  
Diagnosis Date  Benign essential hypertension 1/21/2020 Diagnosed in her 28's  Complete hearing loss of right ear 1/21/2020  
 1982, s/p trauma w/ resultant surgery ending in complete hearing loss  Postoperative hypothyroidism 1/21/2020 S/p total thyroidectomy 2015 for nodular/cystic goiter, benign  S/P total thyroidectomy 1/21/2020 Surgery 2015 for Multinodular Goiter, Cystic masses, Benign Past Surgical History:  
Procedure Laterality Date  HX BREAST BIOPSY Left 1980's Benign Cyst Removed [facility]  HX COLONOSCOPY  2015 Normal done in [place], repeat in 5 yrs due to family hx  HX CYST REMOVAL  1990's Skin on back of neck [facility]. Right Ear Surgery, Traumatic Scars  HX PARTIAL HYSTERECTOMY  in her 41's Uterine Fibroids, Left Ovarian Cysts-Left Oopherectomy  HX THYROIDECTOMY  2015 Cystic Goiter  IR FINE NEEDLE ASPIRATION THYROID  2013 Benign Cysts Social History Socioeconomic History  Marital status:  Spouse name: Not on file  Number of children: 2  
 Years of education: Not on file  Highest education level: Not on file Occupational History  Occupation: Contractor/ for [employer]  Financial resource strain: Not on file  Food insecurity Worry: Not on file Inability: Not on file  Transportation needs Medical: Not on file Non-medical: Not on file Tobacco Use  Smoking status: Never Smoker  Smokeless tobacco: Never Used Substance and Sexual Activity  Alcohol use: Yes Comment: occsaional glass of wine  Drug use: Never  Sexual activity: Yes Lifestyle  Physical activity Days per week: Not on file Minutes per session: Not on file  Stress: Not on file Relationships  Social connections Talks on phone: Not on file Gets together: Not on file Attends Jain service: Not on file Active member of club or organization: Not on file Attends meetings of clubs or organizations: Not on file Relationship status: Not on file  Intimate partner violence Fear of current or ex partner: Not on file Emotionally abused: Not on file Physically abused: Not on file Forced sexual activity: Not on file Other Topics Concern  Not on file Social History Narrative Native of [address] [place] [address] where all of her family still resides. 1 son in [address] [place] [address], but 1 daughter in [address] [facility]. 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 Lives in [facility] near [place]. Family History Problem Relation Age of Onset  Lung Cancer Mother   
      age 68, former smoker  Breast Cancer Mother   
     diagnosed w/ breast cancer in her late 50's/early 63's, surgery, survivor  Hypertension Mother  Prostate Cancer Father   
      in his early [de-identified]  Hypertension Sister  Diabetes Sister  Breast Cancer Sister   
     diagnosed in her early 42's, treated w/ radiation and surgery  Prostate Cancer Brother   
     survivor  Colon Cancer Brother   
     diagnosed in his 52's  Asthma Son  No Known Problems Daughter  Hypertension Sister  High Cholesterol Sister  Hypertension Brother  Obesity Brother  No Known Problems Brother  Heart Disease Brother Allergies Allergen Reactions  Sulfa (Sulfonamide Antibiotics) Swelling Facial swelling  Oxycodone Other (comments) Hallucinations  Morphine Other (comments) Hallucinations  Symbicort [Budesonide-Formoterol] Other (comments) Hyper, insomnia, nightmares No current facility-administered medications for this encounter. Objective:  
 
Vitals:  
 04/20/20 1333 04/20/20 1338 04/20/20 1340 04/20/20 1343 BP: 93/55 118/74 133/76 129/74 Pulse: 64 72 70 69 Resp: 16 16 16 16 Temp:      
SpO2: 97% 96% 97% 96% Weight:      
Height: PHYSICAL EXAM  
 
Constitutional:  the patient is well developed and in no acute distress EENMT:  Sclera clear, pupils equal, oral mucosa moist 
Respiratory: Decreased at right base Cardiovascular:  RRR without M,G,R 
Gastrointestinal: soft and non-tender; with positive bowel sounds. Musculoskeletal: warm without cyanosis. There is no lower extremity edema. Skin:  no jaundice or rashes, no wounds Neurologic: no gross neuro deficits Psychiatric:  alert and oriented x ppt CXR: 
4/18/20 Recent Labs 04/18/20 
1442 WBC 12.0*  
HGB 11.9 HCT 38.1  INR 1.0 Recent Labs 04/18/20 
1442   
K 3.1*  
 * CO2 31 BUN 11  
CREA 1.10* CA 10.0 No results for input(s): PH, PCO2, PO2, HCO3, PHI, PCO2I, PO2I, HCO3I in the last 72 hours. No results for input(s): LCAD, LAC in the last 72 hours. Assessment:  (Medical Decision Making) Hospital Problems  Date Reviewed: 4/14/2020 Codes Class Noted POA Malignant pleural effusion ICD-10-CM: J91.0 ICD-9-CM: 511.81  4/18/2020 Unknown Malignant neoplasm of lung (HCC) ICD-10-CM: C34.90 ICD-9-CM: 162.9  4/18/2020 Yes Pt with stage IV adenocarcinoma of lung origin with malignant pleural effusion. Worsening pain seeking repeat thoracentesis. Discussed the role of this procedure and the consideration of pleurx catheter placement if additional drainage is needed in the future.  She was provided with pleTasted Menu information packet to consider. She indicates she would be interested and will check to see if her insurance will cover the necessary supplies. Plan:  (Medical Decision Making) --Will proceed with thoracentesis, sending fluid for cytology and caris if this would be helpful to oncology. -f/u in pulmonary office within the month. More than 50% of the time documented was spent in face-to-face contact with the patient and in the care of the patient on the floor/unit where the patient is located.  
 
Edi Vasquez MD

## 2020-04-20 NOTE — DISCHARGE INSTRUCTIONS
Patient Education        Thoracentesis: What to Expect at Home  Your Recovery  Thoracentesis (say \"emdu-te-nkk-FEDERICA-sis\") is a procedure to remove fluid from the space between the lungs and the chest wall (pleural space). This procedure may also be called a \"chest tap. \" It is normal to have a small amount of fluid in the pleural space. But too much fluid can build up because of problems such as infection, heart failure, or lung cancer. The procedure may have been done to help with shortness of breath and pain caused by the fluid buildup. Or you may have had this procedure so the doctor could test the fluid to find the cause of the buildup. Your chest may be sore where the doctor put the needle or catheter into your skin (the puncture site). This usually gets better after a day or two. You can go back to work or your normal activities as soon as you feel up to it. If the doctor sent the fluid to a lab for testing, it may take several days to get the results. The doctor or nurse will discuss the results with you. This care sheet gives you a general idea about how long it will take for you to recover. But each person recovers at a different pace. Follow the steps below to feel better as quickly as possible. How can you care for yourself at home? Activity    · Rest when you feel tired. Getting enough sleep will help you recover.     · Avoid strenuous activities, such as bicycle riding, jogging, weight lifting, or aerobic exercise, until your doctor says it is okay.     · You may shower. Do not take a bath until the puncture site has healed, or until your doctor tells you it is okay.     · Ask your doctor when you can drive again.     · You may need to take 1 or 2 days off from work. It depends on the type of work you do and how you feel. Diet    · You can eat your normal diet.     · Drink plenty of fluids (unless your doctor tells you not to).    Medicines    · Your doctor will tell you if and when you can restart your medicines. He or she will also give you instructions about taking any new medicines.     · If you take aspirin or some other blood thinner, ask your doctor if and when to start taking it again. Make sure that you understand exactly what your doctor wants you to do.     · Be safe with medicines. Take pain medicines exactly as directed. ? If the doctor gave you a prescription medicine for pain, take it as prescribed. ? If you are not taking a prescription pain medicine, ask your doctor if you can take an over-the-counter medicine. ? Do not take two or more pain medicines at the same time unless the doctor told you to. Many pain medicines have acetaminophen, which is Tylenol. Too much acetaminophen (Tylenol) can be harmful.     · If you think your pain medicine is making you sick to your stomach:  ? Take your medicine after meals (unless your doctor has told you not to). ? Ask your doctor for a different pain medicine.     · If your doctor prescribed antibiotics, take them as directed. Do not stop taking them just because you feel better. You need to take the full course of antibiotics.    Care of the puncture site    · Wash the area daily with warm, soapy water, and pat it dry. Don't use hydrogen peroxide or alcohol, which may delay healing. You may cover the area with a gauze bandage if it weeps or rubs against clothing. Change the bandage every day.     · Keep the area clean and dry. Follow-up care is a key part of your treatment and safety. Be sure to make and go to all appointments, and call your doctor if you are having problems. It's also a good idea to know your test results and keep a list of the medicines you take. When should you call for help? Call 911 anytime you think you may need emergency care.  For example, call if:    · You passed out (lost consciousness).     · You have severe trouble breathing.     · You have sudden chest pain and shortness of breath, or you cough up blood.    Call your doctor now or seek immediate medical care if:    · You have shortness of breath that is new or getting worse.     · You have new or worse pain in your chest, especially when you take a deep breath.     · You are sick to your stomach or cannot keep fluids down.     · You have a fever over 100°F.     · Bright red blood has soaked through the bandage over your puncture site.     · You have signs of infection, such as:  ? Increased pain, swelling, warmth, or redness. ? Red streaks leading from the puncture site. ? Pus draining from the puncture site. ? Swollen lymph nodes in your neck, armpits, or groin. ? A fever.     · You cough up a lot more mucus than normal, or your mucus changes color.    Watch closely for changes in your health, and be sure to contact your doctor if you have any problems. Where can you learn more? Go to http://evette-diane.info/  Enter Q755 in the search box to learn more about \"Thoracentesis: What to Expect at Home. \"  Current as of: June 9, 2019Content Version: 12.4  © 8630-1611 [employer]. Care instructions adapted under license by [employer] (which disclaims liability or warranty for this information). If you have questions about a medical condition or this instruction, always ask your healthcare professional. [address] any warranty or liability for your use of this information. Start your Eliquis back tomorrow, keep the bandage dry for next 24 hours and keep all follow-up appointments. Please call [facility] @ [phone] for any shortness of breath.        -

## 2020-04-20 NOTE — PROCEDURES
PROCEDURE:  DIAGNOSTIC THORACENTESIS, THERAPEUTIC THORACENTESIS       PRE-OP DIAGNOSIS:  R PLEURAL EFFUSION    POST-OP DIAGNOSIS:  R PLEURAL EFFUSION    VOLUME REMOVED:    450cc    ANESTHESIA:    LOCAL ANESTHESIA WITH 1% LIDOCAINE 10 CC TOTAL. CHEST ULTRASOUND FINDINGS:    A Turbo-M, Sonosite ultrasound with a 5-16 mHz probe was used to image the chest and localize the pleural effusion on the right chest.    A moderate anechoic space was seen on the right consistent with an uncomplicated pleural effusion. DESCRIPTION OF PROCEDURE:    After obtaining informed consent and localizing the safest location for thoracentesis, the  8th intercostal space was marked with a blunt, plastic needle cap in the mid scapular line. An Vis[employer] AK-0100 Pleral-Seal thoracentesis kit was used to perform the procedure. The skin was cleansed with the supplied chlorhexidine swab and then draped in the usual fashion. Using the previously marked location as a guide, a 22 G 1.5 inch needle was used to inject 1% lidocaine into the skin and subcutaneous tissue, as well as onto the underlying rib and inter-costal muscles. Pleural fluid was aspirated to assure proper location and additional lidocaine was injected into the pleural space prior to removing the anesthesia needle. A 3mm incision was then made with the supplied scalpel in the usual fashion to facilitate the insertion of the thoracentesis needle. The needle with an 8 Tanzanian thoracentesis catheter was then introduced into the chest through the previously made incision in the usual fashion, the rib localized with the needle, and the catheter then marched over the rib into the pleural space. After aspirating fluid, the thoracentesis catheter was then placed into the chest using the needle itself as a trocar. The needle was then removed and the catheter was attached to the supplied tubing without complication.     450 cc of serosanguinous fluid was aspirated and sent for analysis. Fluid was sent for the following tests:      Cytology      Post procedure US confirmed incomplete drainage of the effusion and presence of lung sliding, ruling out pneumothorax. (45120)    EBL:     <6OS    COMPLICATIONS:    Hypotension limited drainage of fluid. This happened with last drainage as well and was likely vagal in nature. Patient was laid flat and BP recovered.        Lucas Martinez MD

## 2020-04-22 ENCOUNTER — TELEPHONE (OUTPATIENT)
Dept: FAMILY MEDICINE CLINIC | Age: 65
End: 2020-04-22

## 2020-04-22 NOTE — TELEPHONE ENCOUNTER
Ryley Prince with dr. Hayley Pérez is calling requesting a call back from the nurse in regards to get a clearance for this patient's ELIQUIS medication. Patient's surgery is on 04/27/2020.  Please fax it to fax# [phone]    Best call# [phone]

## 2020-04-22 NOTE — TELEPHONE ENCOUNTER
Called and spoke to Giovana Torres at [facility] about patient. Patient having surgical lymph node biopsy at [facility] [place] [place] on Monday 04/27/20. Asking if you can do letter okaying to hold Eliquis starting this  Sat.04/25th.  and fax to them

## 2020-04-23 ENCOUNTER — TELEPHONE (OUTPATIENT)
Dept: FAMILY MEDICINE CLINIC | Age: 65
End: 2020-04-23

## 2020-04-23 NOTE — TELEPHONE ENCOUNTER
----- Message from Mercy Rahman sent at 4/23/2020  1:57 PM EDT -----  Regarding: Dr. Oconnor Genre: [phone]  Caller: Jennifer Marquez(care giver)    Reason for call: [facility] states that the pt needs to be taken off of a medication (Eliquis) until after her surgery on Monday April 27th. The Surgery doctor needs a confirmation faxed over by 11AM tomorrow.     Surgeon: Dr. Minerva Davis  Fax number: [phone]

## 2020-04-23 NOTE — TELEPHONE ENCOUNTER
Note faxed to [facility] to hold eliquis signed by Dr. Wendy Pierre. Confirmation fax formed.

## 2020-04-24 ENCOUNTER — PATIENT OUTREACH (OUTPATIENT)
Dept: CASE MANAGEMENT | Age: 65
End: 2020-04-24

## 2020-04-24 ENCOUNTER — HOSPITAL ENCOUNTER (OUTPATIENT)
Dept: LAB | Age: 65
Discharge: HOME | End: 2020-04-24
Payer: MEDICARE

## 2020-04-24 DIAGNOSIS — C34.90 MALIGNANT NEOPLASM OF LUNG, UNSPECIFIED LATERALITY, UNSPECIFIED PART OF LUNG (HCC): ICD-10-CM

## 2020-04-24 LAB
ALBUMIN SERPL-MCNC: 3.8 G/DL (ref 3.2–4.6)
ALBUMIN/GLOB SERPL: 0.8 {RATIO} (ref 1.2–3.5)
ALP SERPL-CCNC: 159 U/L (ref 50–136)
ALT SERPL-CCNC: 23 U/L (ref 12–65)
ANION GAP SERPL CALC-SCNC: 4 MMOL/L (ref 7–16)
AST SERPL-CCNC: 12 U/L (ref 15–37)
BASOPHILS # BLD: 0.1 K/UL (ref 0–0.2)
BASOPHILS NFR BLD: 1 % (ref 0–2)
BILIRUB SERPL-MCNC: 0.5 MG/DL (ref 0.2–1.1)
BUN SERPL-MCNC: 9 MG/DL (ref 8–23)
CALCIUM SERPL-MCNC: 10.1 MG/DL (ref 8.3–10.4)
CEA SERPL-MCNC: 44.7 NG/ML (ref 0–3)
CHLORIDE SERPL-SCNC: 101 MMOL/L (ref 98–107)
CO2 SERPL-SCNC: 31 MMOL/L (ref 21–32)
CREAT SERPL-MCNC: 1.1 MG/DL (ref 0.6–1)
DIFFERENTIAL METHOD BLD: ABNORMAL
EOSINOPHIL # BLD: 0.3 K/UL (ref 0–0.8)
EOSINOPHIL NFR BLD: 3 % (ref 0.5–7.8)
ERYTHROCYTE [DISTWIDTH] IN BLOOD BY AUTOMATED COUNT: 16.1 % (ref 11.9–14.6)
GLOBULIN SER CALC-MCNC: 4.5 G/DL (ref 2.3–3.5)
GLUCOSE SERPL-MCNC: 99 MG/DL (ref 65–100)
HCT VFR BLD AUTO: 38.1 % (ref 35.8–46.3)
HGB BLD-MCNC: 11.9 G/DL (ref 11.7–15.4)
IMM GRANULOCYTES # BLD AUTO: 0 K/UL (ref 0–0.5)
IMM GRANULOCYTES NFR BLD AUTO: 0 % (ref 0–5)
LYMPHOCYTES # BLD: 2.6 K/UL (ref 0.5–4.6)
LYMPHOCYTES NFR BLD: 25 % (ref 13–44)
MCH RBC QN AUTO: 23.8 PG (ref 26.1–32.9)
MCHC RBC AUTO-ENTMCNC: 31.2 G/DL (ref 31.4–35)
MCV RBC AUTO: 76.4 FL (ref 79.6–97.8)
MONOCYTES # BLD: 0.7 K/UL (ref 0.1–1.3)
MONOCYTES NFR BLD: 7 % (ref 4–12)
NEUTS SEG # BLD: 6.8 K/UL (ref 1.7–8.2)
NEUTS SEG NFR BLD: 65 % (ref 43–78)
NRBC # BLD: 0 K/UL (ref 0–0.2)
PLATELET # BLD AUTO: 402 K/UL (ref 150–450)
PMV BLD AUTO: 10.1 FL (ref 9.4–12.3)
POTASSIUM SERPL-SCNC: 3.2 MMOL/L (ref 3.5–5.1)
PROT SERPL-MCNC: 8.3 G/DL (ref 6.3–8.2)
RBC # BLD AUTO: 4.99 M/UL (ref 4.05–5.25)
SODIUM SERPL-SCNC: 136 MMOL/L (ref 136–145)
WBC # BLD AUTO: 10.4 K/UL (ref 4.3–11.1)

## 2020-04-24 PROCEDURE — 82378 CARCINOEMBRYONIC ANTIGEN: CPT

## 2020-04-24 PROCEDURE — 36415 COLL VENOUS BLD VENIPUNCTURE: CPT

## 2020-04-24 PROCEDURE — 80053 COMPREHEN METABOLIC PANEL: CPT

## 2020-04-24 PROCEDURE — 85025 COMPLETE CBC W/AUTO DIFF WBC: CPT

## 2020-04-24 NOTE — PROGRESS NOTES
I saw patient today in office with Dr Siobhan Bautista. Dr Siobhan Bautista explained patient is ALK positive from pathology in [place] so we will plan to start Alectinib 600mg BID-script sent to [facility] in [facility] as requested per [facility]. Pt will have lymph node bx on Monday with Dr Esequiel Doty as scheduled. Pt will be scheduled for chemo ed with [facility] via phone. Pt encouraged to call with any questions or complaints.  Nurse navigation is following

## 2020-04-24 NOTE — TELEPHONE ENCOUNTER
----- Message from Emilio Hernández LPN sent at 7/70/7443  1:10 PM EDT -----  Regarding: RE: Dr. Carroll Figueroa called and LVM for Almita Winslow. Are you ok with pt holding the eliquis?  ----- Message -----  From: Terrie Jiménez: 4/23/2020  12:48 PM EDT  To: [facility] Nurse Pool  Subject: Dr. Erich Moore Telephone                    Pt need a clearance to be off of her Eliquis for a nodule on her neck surgery on 4/27 with Dr. Georgeann Nageotte the fax number is [phone]. Pt states they need this information is needed by 11am tomorrow or her surgery will be cancelled.

## 2020-04-24 NOTE — TELEPHONE ENCOUNTER
Signed form for clearance to hold Eliquis x 72 hrs for scheduled cervical lymph node dissection 4/27/2020.

## 2020-04-24 NOTE — TELEPHONE ENCOUNTER
Completed and signed form for auth to hold Eliquis x 72 hrs prior to scheduled surgery for cervical lymph node dissection 4/27.

## 2020-04-26 ENCOUNTER — ANESTHESIA EVENT (OUTPATIENT)
Dept: SURGERY | Age: 65
End: 2020-04-26
Payer: COMMERCIAL

## 2020-04-27 ENCOUNTER — ANESTHESIA (OUTPATIENT)
Dept: SURGERY | Age: 65
End: 2020-04-27
Payer: COMMERCIAL

## 2020-04-27 ENCOUNTER — HOSPITAL ENCOUNTER (OUTPATIENT)
Age: 65
Setting detail: OUTPATIENT SURGERY
Discharge: HOME | End: 2020-04-27
Attending: SURGERY | Admitting: SURGERY
Payer: COMMERCIAL

## 2020-04-27 VITALS
RESPIRATION RATE: 17 BRPM | DIASTOLIC BLOOD PRESSURE: 71 MMHG | SYSTOLIC BLOOD PRESSURE: 117 MMHG | TEMPERATURE: 97.2 F | HEART RATE: 83 BPM | OXYGEN SATURATION: 95 %

## 2020-04-27 LAB — POTASSIUM BLD-SCNC: 3.4 MMOL/L (ref 3.5–5.1)

## 2020-04-27 PROCEDURE — 77030010509 HC AIRWY LMA MSK TELE -A: Performed by: NURSE ANESTHETIST, CERTIFIED REGISTERED

## 2020-04-27 PROCEDURE — 74011000250 HC RX REV CODE- 250: Performed by: NURSE ANESTHETIST, CERTIFIED REGISTERED

## 2020-04-27 PROCEDURE — 74011250636 HC RX REV CODE- 250/636: Performed by: NURSE ANESTHETIST, CERTIFIED REGISTERED

## 2020-04-27 PROCEDURE — 77030018836 HC SOL IRR NACL ICUM -A: Performed by: SURGERY

## 2020-04-27 PROCEDURE — 88305 TISSUE EXAM BY PATHOLOGIST: CPT

## 2020-04-27 PROCEDURE — 77030002933 HC SUT MCRYL J&J -A: Performed by: SURGERY

## 2020-04-27 PROCEDURE — 88341 IMHCHEM/IMCYTCHM EA ADD ANTB: CPT

## 2020-04-27 PROCEDURE — 76010000138 HC OR TIME 0.5 TO 1 HR: Performed by: SURGERY

## 2020-04-27 PROCEDURE — 76210000006 HC OR PH I REC 0.5 TO 1 HR: Performed by: SURGERY

## 2020-04-27 PROCEDURE — 76210000020 HC REC RM PH II FIRST 0.5 HR: Performed by: SURGERY

## 2020-04-27 PROCEDURE — 88342 IMHCHEM/IMCYTCHM 1ST ANTB: CPT

## 2020-04-27 PROCEDURE — 74011250636 HC RX REV CODE- 250/636: Performed by: ANESTHESIOLOGY

## 2020-04-27 PROCEDURE — 74011250636 HC RX REV CODE- 250/636: Performed by: SURGERY

## 2020-04-27 PROCEDURE — 77030040361 HC SLV COMPR DVT MDII -B: Performed by: SURGERY

## 2020-04-27 PROCEDURE — 74011000250 HC RX REV CODE- 250: Performed by: SURGERY

## 2020-04-27 PROCEDURE — 84132 ASSAY OF SERUM POTASSIUM: CPT

## 2020-04-27 PROCEDURE — 76060000032 HC ANESTHESIA 0.5 TO 1 HR: Performed by: SURGERY

## 2020-04-27 PROCEDURE — 77030002996 HC SUT SLK J&J -A: Performed by: SURGERY

## 2020-04-27 RX ORDER — ONDANSETRON 2 MG/ML
INJECTION INTRAMUSCULAR; INTRAVENOUS AS NEEDED
Status: DISCONTINUED | OUTPATIENT
Start: 2020-04-27 | End: 2020-04-27 | Stop reason: HOSPADM

## 2020-04-27 RX ORDER — SODIUM CHLORIDE, SODIUM LACTATE, POTASSIUM CHLORIDE, CALCIUM CHLORIDE 600; 310; 30; 20 MG/100ML; MG/100ML; MG/100ML; MG/100ML
75 INJECTION, SOLUTION INTRAVENOUS CONTINUOUS
Status: DISCONTINUED | OUTPATIENT
Start: 2020-04-27 | End: 2020-04-27 | Stop reason: HOSPADM

## 2020-04-27 RX ORDER — FENTANYL CITRATE 50 UG/ML
INJECTION, SOLUTION INTRAMUSCULAR; INTRAVENOUS AS NEEDED
Status: DISCONTINUED | OUTPATIENT
Start: 2020-04-27 | End: 2020-04-27 | Stop reason: HOSPADM

## 2020-04-27 RX ORDER — MIDAZOLAM HYDROCHLORIDE 1 MG/ML
2 INJECTION, SOLUTION INTRAMUSCULAR; INTRAVENOUS
Status: DISCONTINUED | OUTPATIENT
Start: 2020-04-27 | End: 2020-04-27 | Stop reason: HOSPADM

## 2020-04-27 RX ORDER — LIDOCAINE HYDROCHLORIDE 10 MG/ML
0.1 INJECTION INFILTRATION; PERINEURAL AS NEEDED
Status: DISCONTINUED | OUTPATIENT
Start: 2020-04-27 | End: 2020-04-27 | Stop reason: HOSPADM

## 2020-04-27 RX ORDER — PROPOFOL 10 MG/ML
INJECTION, EMULSION INTRAVENOUS AS NEEDED
Status: DISCONTINUED | OUTPATIENT
Start: 2020-04-27 | End: 2020-04-27 | Stop reason: HOSPADM

## 2020-04-27 RX ORDER — CEFAZOLIN SODIUM/WATER 2 G/20 ML
2 SYRINGE (ML) INTRAVENOUS ONCE
Status: COMPLETED | OUTPATIENT
Start: 2020-04-27 | End: 2020-04-27

## 2020-04-27 RX ORDER — BUPIVACAINE HYDROCHLORIDE 5 MG/ML
INJECTION, SOLUTION EPIDURAL; INTRACAUDAL AS NEEDED
Status: DISCONTINUED | OUTPATIENT
Start: 2020-04-27 | End: 2020-04-27 | Stop reason: HOSPADM

## 2020-04-27 RX ORDER — DEXAMETHASONE SODIUM PHOSPHATE 4 MG/ML
INJECTION, SOLUTION INTRA-ARTICULAR; INTRALESIONAL; INTRAMUSCULAR; INTRAVENOUS; SOFT TISSUE AS NEEDED
Status: DISCONTINUED | OUTPATIENT
Start: 2020-04-27 | End: 2020-04-27 | Stop reason: HOSPADM

## 2020-04-27 RX ORDER — SODIUM CHLORIDE 0.9 % (FLUSH) 0.9 %
5-40 SYRINGE (ML) INJECTION AS NEEDED
Status: DISCONTINUED | OUTPATIENT
Start: 2020-04-27 | End: 2020-04-27 | Stop reason: HOSPADM

## 2020-04-27 RX ORDER — LIDOCAINE HYDROCHLORIDE 20 MG/ML
INJECTION, SOLUTION EPIDURAL; INFILTRATION; INTRACAUDAL; PERINEURAL AS NEEDED
Status: DISCONTINUED | OUTPATIENT
Start: 2020-04-27 | End: 2020-04-27 | Stop reason: HOSPADM

## 2020-04-27 RX ORDER — NALOXONE HYDROCHLORIDE 0.4 MG/ML
0.2 INJECTION, SOLUTION INTRAMUSCULAR; INTRAVENOUS; SUBCUTANEOUS AS NEEDED
Status: DISCONTINUED | OUTPATIENT
Start: 2020-04-27 | End: 2020-04-27 | Stop reason: HOSPADM

## 2020-04-27 RX ORDER — SODIUM CHLORIDE 0.9 % (FLUSH) 0.9 %
5-40 SYRINGE (ML) INJECTION EVERY 8 HOURS
Status: DISCONTINUED | OUTPATIENT
Start: 2020-04-27 | End: 2020-04-27 | Stop reason: HOSPADM

## 2020-04-27 RX ADMIN — FENTANYL CITRATE 25 MCG: 50 INJECTION INTRAMUSCULAR; INTRAVENOUS at 07:36

## 2020-04-27 RX ADMIN — ONDANSETRON 4 MG: 2 INJECTION INTRAMUSCULAR; INTRAVENOUS at 07:30

## 2020-04-27 RX ADMIN — PROPOFOL 50 MG: 10 INJECTION, EMULSION INTRAVENOUS at 07:26

## 2020-04-27 RX ADMIN — FENTANYL CITRATE 25 MCG: 50 INJECTION INTRAMUSCULAR; INTRAVENOUS at 07:34

## 2020-04-27 RX ADMIN — PROPOFOL 200 MG: 10 INJECTION, EMULSION INTRAVENOUS at 07:20

## 2020-04-27 RX ADMIN — PROPOFOL 50 MG: 10 INJECTION, EMULSION INTRAVENOUS at 07:37

## 2020-04-27 RX ADMIN — DEXAMETHASONE SODIUM PHOSPHATE 4 MG: 4 INJECTION, SOLUTION INTRAMUSCULAR; INTRAVENOUS at 07:30

## 2020-04-27 RX ADMIN — FENTANYL CITRATE 25 MCG: 50 INJECTION INTRAMUSCULAR; INTRAVENOUS at 07:25

## 2020-04-27 RX ADMIN — FENTANYL CITRATE 25 MCG: 50 INJECTION INTRAMUSCULAR; INTRAVENOUS at 07:28

## 2020-04-27 RX ADMIN — PHENYLEPHRINE HYDROCHLORIDE 200 MCG: 10 INJECTION INTRAVENOUS at 07:47

## 2020-04-27 RX ADMIN — LIDOCAINE HYDROCHLORIDE 100 MG: 20 INJECTION, SOLUTION EPIDURAL; INFILTRATION; INTRACAUDAL; PERINEURAL at 07:20

## 2020-04-27 RX ADMIN — PHENYLEPHRINE HYDROCHLORIDE 200 MCG: 10 INJECTION INTRAVENOUS at 07:40

## 2020-04-27 RX ADMIN — Medication 2 G: at 07:28

## 2020-04-27 RX ADMIN — SODIUM CHLORIDE, SODIUM LACTATE, POTASSIUM CHLORIDE, AND CALCIUM CHLORIDE: 600; 310; 30; 20 INJECTION, SOLUTION INTRAVENOUS at 07:13

## 2020-04-27 NOTE — ANESTHESIA PREPROCEDURE EVALUATION
Relevant Problems   No relevant active problems       Anesthetic History   No history of anesthetic complications            Review of Systems / Medical History  Patient summary reviewed and pertinent labs reviewed    Pulmonary                Comments: Malignant pleural effusion   Neuro/Psych              Cardiovascular    Hypertension: well controlled              Exercise tolerance: >4 METS  Comments: DVT   GI/Hepatic/Renal                Endo/Other      Hypothyroidism  Cancer (Lung, pelvic mass and retroperitoneal lymph adenopathy)     Other Findings   Comments: Thyroidectomy in 1/2020    Cervical lymphadenopathy           Physical Exam    Airway  Mallampati: II  TM Distance: 4 - 6 cm  Neck ROM: normal range of motion   Mouth opening: Normal     Cardiovascular    Rhythm: regular           Dental  No notable dental hx       Pulmonary                 Abdominal  GI exam deferred       Other Findings            Anesthetic Plan    ASA: 3  Anesthesia type: general          Induction: Intravenous  Anesthetic plan and risks discussed with: Patient

## 2020-04-27 NOTE — OP NOTES
[facility]  OPERATIVE REPORT    Name:  Nabeel Beyer  MR#:  [de-identified]  :  1955  ACCOUNT #:  [de-identified]  DATE OF SERVICE:  2020    PREOPERATIVE DIAGNOSIS:  Lung carcinoma with right cervical and supraclavicular lymphadenopathy. POSTOPERATIVE DIAGNOSIS:  Lung carcinoma with right cervical and supraclavicular lymphadenopathy. PROCEDURE PERFORMED:  Excision of right cervical lymph node. SURGEON:  Lory Cheney. Lena Cortez MD    ASSISTANT:  None. ANESTHESIA:  General.    COMPLICATIONS:  None. COUNT:  Correct. SPECIMENS REMOVED:  Right cervical lymph node. IMPLANTS:  none. ESTIMATED BLOOD LOSS:  Minimal.    PROCEDURE:  After the patient was asleep, the right neck was prepped and draped in sterile fashion. She had been marked in the preoperative area. A transverse incision was made directly over the node. Dissection was carried down until the node was encountered and to get there I used gentle spreading action with a hemostat. The node was then excised and sent to Pathology in the fresh state. There was only very minor skin oozing, which was taken care with Bovie cautery. A 4-0 subcuticular Monocryl was utilized to close the skin. Sterile dressing applied. The patient tolerated the procedure well.       Jen Brunson MD      [id number]  D:  2020 7:58  T:  2020 10:50  JOB #:  [id number]

## 2020-04-27 NOTE — BRIEF OP NOTE
Brief Postoperative Note    Patient: Rose Espinosa  YOB: 1955  MRN: [id number]    Date of Procedure: 4/27/2020     Pre-Op Diagnosis: Lymphadenopathy of right cervical region [R59.0]    Post-Op Diagnosis: Same as preoperative diagnosis.       Procedure(s):  EXCISION OF RIGHT CERVICAL LYMPH NODE    Surgeon(s):  Jodie Dunn MD    Surgical Assistant: None    Anesthesia: General     Estimated Blood Loss (mL): Minimal    Complications: None    Specimens:   ID Type Source Tests Collected by Time Destination   1 : Right cervical lymph node Fresh Neck  Jodie Dunn MD 4/27/2020 0740 Pathology        Implants: * No implants in log *    Drains: * No LDAs found *    Findings: see op note    Electronically Signed by Demetris Leonard MD on 4/27/2020 at 7:58 AM

## 2020-04-27 NOTE — PROGRESS NOTES
Patient is calm  pleasant  Alexander family present    Prayer    Flores Caicedo, staff Sade snyder 02, [address] [facility]  /   [email or web address]

## 2020-04-27 NOTE — ANESTHESIA POSTPROCEDURE EVALUATION
Procedure(s):  EXCISION OF RIGHT CERVICAL LYMPH NODE.    general    Anesthesia Post Evaluation      Multimodal analgesia: multimodal analgesia used between 6 hours prior to anesthesia start to PACU discharge  Patient location during evaluation: PACU  Patient participation: complete - patient participated  Level of consciousness: awake and alert  Pain management: adequate  Airway patency: patent  Anesthetic complications: no  Cardiovascular status: acceptable  Respiratory status: acceptable  Hydration status: acceptable  Post anesthesia nausea and vomiting:  none      Vitals Value Taken Time   /70 4/27/2020  8:31 AM   Temp 36.3 °C (97.3 °F) 4/27/2020  8:01 AM   Pulse 83 4/27/2020  8:31 AM   Resp 16 4/27/2020  8:10 AM   SpO2 95 % 4/27/2020  8:31 AM   Vitals shown include unvalidated device data.

## 2020-04-27 NOTE — DISCHARGE INSTRUCTIONS
Restart Eliquis Wednesday        Soft Tissue Mass or Cyst Excision    Activity  · As tolerated and as directed by your doctor. · Bathe or shower as directed by your doctor    Diet  · Clear liquids until no nausea or vomiting; then light diet for hte first day. · Advance to regular diet on the second day unless your doctor orders otherwise. · If nausea and vomiting continue, call your doctor. Pain  · Take pain medication as directed by your doctor. · Call your doctor if your pain is NOT relieved by medication. · DO NOT take aspirin or blood thinners until directed by your doctor. If you have any problems or concerns, call your doctor as needed or if you experience;  · Excessive bleeding that does not stop after holding mild pressure over the area for several minutes. · Temperature of 101 degrees Fahrenheit or above. · Redness, excessive swelling or bruising, and/or green or yellow, smelly discharge from incision. After general anesthesia or intravenous sedation, for 24 hours or while taking prescription Narcotics:  · Limit your activities  · A responsible adult needs to be with you for the next 24 hours  · Do not drive and operate hazardous machinery  · Do not make important personal or business decisions  · Do not drink alcoholic beverages  · If you have not urinated within 8 hours after discharge, please contact your surgeon on call. · If you have sleep apnea and have a CPAP machine, please use it for all naps and sleeping. · Please use caution when taking narcotics and any of your home medications that may cause drowsiness. *  Please give a list of your current medications to your Primary Care Provider. *  Please update this list whenever your medications are discontinued, doses are      changed, or new medications (including over-the-counter products) are added. *  Please carry medication information at all times in case of emergency situations.     These are general instructions for a healthy lifestyle:  No smoking/ No tobacco products/ Avoid exposure to second hand smoke  Surgeon General's Warning:  Quitting smoking now greatly reduces serious risk to your health. Obesity, smoking, and sedentary lifestyle greatly increases your risk for illness  A healthy diet, regular physical exercise & weight monitoring are important for maintaining a healthy lifestyle    You may be retaining fluid if you have a history of heart failure or if you experience any of the following symptoms:  Weight gain of 3 pounds or more overnight or 5 pounds in a week, increased swelling in our hands or feet or shortness of breath while lying flat in bed. Please call your doctor as soon as you notice any of these symptoms; do not wait until your next office visit.

## 2020-04-30 NOTE — PROGRESS NOTES
Financial Navigator spoke with Ms. Bety Larose regarding her [employer] insurance coverage. Financial Navigator explained plan benefits to ensure she understood  her patient responsibility. I spoke with Ms. Bety Larose regarding the [address] Notification Letter. I answered questions regarding the costs associated with  Medicare Benefits. I explained to the Ms. Madrid the estimated cost of treatment and services for six months under the [employer]. Ms. Bety Larose was   advised to contact Medicare or their healthcare provider for questions or concerns related to service of care. The Oncology Care Model provides different options  of contact for Ms. Madrid regarding concerns and complaints of treatments and services. Ms. Bety Larose expressed understanding of the information above and all   questions were answered to her satisfaction. Next, I spoke with Ms. Bety Larose regarding potential oral medication authorizations. I told her that if she ever had any problems getting her oral medications filled to give   the dedicated [facility]  a call. Most of the time, it is simply an authorization that needs to be done with the insurance company. Next, I spoke with Ms. Bety Larose regarding enrolling with [address] [place] [address] Ave. I went over some of the services that [facility] provide. Next, I gave Ms. Madrid flyers about the free Yoga classes for cancer survivors, Lenard Perez, and the Oncology Massage program.  I let her know that   she can get a free complimentary hand and foot massage. Lastly, I gave Ms. Madrid a form with various resource organizations that could assist with specific needs (example:  transportation, lodging, preparing meals,   home cleaning)

## 2020-05-04 ENCOUNTER — HOSPITAL ENCOUNTER (OUTPATIENT)
Age: 65
Setting detail: OUTPATIENT SURGERY
Discharge: HOME | End: 2020-05-04
Attending: INTERNAL MEDICINE | Admitting: INTERNAL MEDICINE
Payer: COMMERCIAL

## 2020-05-04 VITALS
SYSTOLIC BLOOD PRESSURE: 149 MMHG | OXYGEN SATURATION: 98 % | DIASTOLIC BLOOD PRESSURE: 77 MMHG | RESPIRATION RATE: 16 BRPM | HEART RATE: 88 BPM

## 2020-05-04 DIAGNOSIS — J90 PLEURAL EFFUSION, RIGHT: ICD-10-CM

## 2020-05-04 PROCEDURE — 32555 ASPIRATE PLEURA W/ IMAGING: CPT | Performed by: INTERNAL MEDICINE

## 2020-05-04 PROCEDURE — 76040000007: Performed by: INTERNAL MEDICINE

## 2020-05-04 PROCEDURE — 77030014147 HC TY THORCENT PARA TELE -B: Performed by: INTERNAL MEDICINE

## 2020-05-04 PROCEDURE — 76604 US EXAM CHEST: CPT | Performed by: INTERNAL MEDICINE

## 2020-05-04 NOTE — PROGRESS NOTES
Pt sat up on side of bed for thoracentesis. Consent obtained. Time out performed. Pts vitals monitored throughout procedure. Right ultrasound done and pic taken of pleural fluid.  ~1000 ml yellow pleural fluid from right. Pt again vagaled down and pt then laid back down on stretcher until episode was over and then sat back on side of stretcher to complete procedure. No specimens sent to the lab. Site dressed appropriately. Discharge instructions reviewed and pt then discharged out of department via wheel chair by SHERRY Bunch RN

## 2020-05-04 NOTE — DISCHARGE INSTRUCTIONS
Patient Education        Thoracentesis: What to Expect at Home  Your Recovery  Thoracentesis (say \"clkm-cd-mzo-FEDERICA-sis\") is a procedure to remove fluid from the space between the lungs and the chest wall (pleural space). This procedure may also be called a \"chest tap. \" It is normal to have a small amount of fluid in the pleural space. But too much fluid can build up because of problems such as infection, heart failure, or lung cancer. The procedure may have been done to help with shortness of breath and pain caused by the fluid buildup. Or you may have had this procedure so the doctor could test the fluid to find the cause of the buildup. Your chest may be sore where the doctor put the needle or catheter into your skin (the puncture site). This usually gets better after a day or two. You can go back to work or your normal activities as soon as you feel up to it. If the doctor sent the fluid to a lab for testing, it may take several days to get the results. The doctor or nurse will discuss the results with you. This care sheet gives you a general idea about how long it will take for you to recover. But each person recovers at a different pace. Follow the steps below to feel better as quickly as possible. How can you care for yourself at home? Activity    · Rest when you feel tired. Getting enough sleep will help you recover.     · Avoid strenuous activities, such as bicycle riding, jogging, weight lifting, or aerobic exercise, until your doctor says it is okay.     · You may shower. Do not take a bath until the puncture site has healed, or until your doctor tells you it is okay.     · Ask your doctor when you can drive again.     · You may need to take 1 or 2 days off from work. It depends on the type of work you do and how you feel. Diet    · You can eat your normal diet.     · Drink plenty of fluids (unless your doctor tells you not to).    Medicines    · Your doctor will tell you if and when you can restart your medicines. He or she will also give you instructions about taking any new medicines.     · If you take aspirin or some other blood thinner, ask your doctor if and when to start taking it again. Make sure that you understand exactly what your doctor wants you to do.     · Be safe with medicines. Take pain medicines exactly as directed. ? If the doctor gave you a prescription medicine for pain, take it as prescribed. ? If you are not taking a prescription pain medicine, ask your doctor if you can take an over-the-counter medicine. ? Do not take two or more pain medicines at the same time unless the doctor told you to. Many pain medicines have acetaminophen, which is Tylenol. Too much acetaminophen (Tylenol) can be harmful.     · If you think your pain medicine is making you sick to your stomach:  ? Take your medicine after meals (unless your doctor has told you not to). ? Ask your doctor for a different pain medicine.     · If your doctor prescribed antibiotics, take them as directed. Do not stop taking them just because you feel better. You need to take the full course of antibiotics.    Care of the puncture site    · Wash the area daily with warm, soapy water, and pat it dry. Don't use hydrogen peroxide or alcohol, which may delay healing. You may cover the area with a gauze bandage if it weeps or rubs against clothing. Change the bandage every day.     · Keep the area clean and dry. Follow-up care is a key part of your treatment and safety. Be sure to make and go to all appointments, and call your doctor if you are having problems. It's also a good idea to know your test results and keep a list of the medicines you take. When should you call for help? Call 911 anytime you think you may need emergency care.  For example, call if:    · You passed out (lost consciousness).     · You have severe trouble breathing.     · You have sudden chest pain and shortness of breath, or you cough up blood.    Call your doctor now or seek immediate medical care if:    · You have shortness of breath that is new or getting worse.     · You have new or worse pain in your chest, especially when you take a deep breath.     · You are sick to your stomach or cannot keep fluids down.     · You have a fever over 100°F.     · Bright red blood has soaked through the bandage over your puncture site.     · You have signs of infection, such as:  ? Increased pain, swelling, warmth, or redness. ? Red streaks leading from the puncture site. ? Pus draining from the puncture site. ? Swollen lymph nodes in your neck, armpits, or groin. ? A fever.     · You cough up a lot more mucus than normal, or your mucus changes color.    Watch closely for changes in your health, and be sure to contact your doctor if you have any problems. Where can you learn more? Go to http://evette-diane.info/  Enter Q755 in the search box to learn more about \"Thoracentesis: What to Expect at Home. \"  Current as of: June 9, 2019Content Version: 12.4  © 2404-3588 [employer]wise[employer]. Care instructions adapted under license by [employer] (which disclaims liability or warranty for this information). If you have questions about a medical condition or this instruction, always ask your healthcare professional. [address] any warranty or liability for your use of this information. Please keep bandage dry for next 24 hours and keep all follow-up appointments. If you become short of breath before your next [facility] Pul[facility] appointment please call them @ [phone]. Resume your Eliquis this evening.

## 2020-05-04 NOTE — INTERVAL H&P NOTE
Update History & Physical 
 
The Patient's History and Physical of May 4, Thoracnetesis was reviewed with the patient and I examined the patient. There was no change. The surgical site was confirmed by the patient and me. Plan:  The risk, benefits, expected outcome, and alternative to the recommended procedure have been discussed with the patient. Patient understands and wants to proceed with the procedure.  
 
Electronically signed by Mikayla Cabral MD on 5/4/2020 at 1:06 PM

## 2020-05-04 NOTE — PROCEDURES
PROCEDURE:  THERAPEUTIC THORACENTESIS     PRE-OP DIAGNOSIS:  Right PLEURAL EFFUSION    POST-OP DIAGNOSIS:  Right PLEURAL EFFUSION    VOLUME REMOVED:    1000cc    ANESTHESIA:    LOCAL ANESTHESIA WITH 1% LIDOCAINE 15 CC TOTAL. CHEST ULTRASOUND FINDINGS:    A Turbo-M, Sonosite ultrasound with a 5-16 mHz probe was used to image the chest and localize the pleural effusion on the right chest.    A moderate anechoic space was seen on the right consistent with an uncomplicated pleural effusion. DESCRIPTION OF PROCEDURE:    After obtaining informed consent and localizing the safest location for thoracentesis, the  8th intercostal space was marked with a blunt, plastic needle cap in the mid scapular line. An [employer] AK-0100 Pleral-Seal thoracentesis kit was used to perform the procedure. The skin was cleansed with the supplied chlorhexidine swab and then draped in the usual fashion. Using the previously marked location as a guide, a 22 G 1.5 inch needle was used to inject 1% lidocaine into the skin and subcutaneous tissue, as well as onto the underlying rib and inter-costal muscles. Pleural fluid was aspirated to assure proper location and additional lidocaine was injected into the pleural space prior to removing the anesthesia needle. A 3mm incision was then made with the supplied scalpel in the usual fashion to facilitate the insertion of the thoracentesis needle. The needle with an 8 Qatari thoracentesis catheter was then introduced into the chest through the previously made incision in the usual fashion, the rib localized with the needle, and the catheter then marched over the rib into the pleural space. After aspirating fluid, the thoracentesis catheter was then placed into the chest using the needle itself as a trocar. The needle was then removed and the catheter was attached to the supplied tubing without complication.     1000 cc of turbid yellow fluid was aspirated and sent for analysis. No samples were sent for analysis. This has previously been proven malignant    Post procedure US confirmed very near complete drainage of the effusion and presence of lung sliding, ruling out pneumothorax. (55948)    EBL:     1drop    COMPLICATIONS:    Patient has vasovagal response with HR to high 40s, low 50s for several minutes. We laid her down and let her recover. She recovered after about 5 minutes and then we sat her back up to drain 1L off. We stopped just before complete drainage due to right sided pressure pain.  We let her lay on her back for 5 more minutes after completion and she was feeling better, vital signs had recovered to normal.      Jami Leslie MD

## 2020-05-07 NOTE — TELEPHONE ENCOUNTER
----- Message from Katie Garcia sent at 5/7/2020  9:27 AM EDT -----  Regarding: Dr. Markos Edward: [phone]  Name of medication and dosage if known: Eliquis -5mg  Is patient out of this medication (yes/no): yes     Pharmacy name: [address] [place] [address] listed in chart? (yes/no): yes  Pharmacy phone Number: [phone]    Date of last visit:  Wednesday, March 11, 2020 10:20 AM    .  Requested Prescriptions     Pending Prescriptions Disp Refills    apixaban (Eliquis) 5 mg tablet       Sig: Take 1 Tab by mouth two (2) times a day.

## 2020-05-07 NOTE — TELEPHONE ENCOUNTER
Patient has moved back to [place]. She is being followed by Oncologist in [place] now who is managing her medications. She needs to contact them to get this medication renewed for continuity of care horace now that she will be doing chemo w/ them and will no longer be followed here.

## 2020-05-07 NOTE — TELEPHONE ENCOUNTER
Patient call for new rx. Last Visit: 3/11/20  MD Chacko  Next Appointment: Not scheduled  Previous Refill Encounter(s): Historical provider (5mg bid)    Requested Prescriptions     Pending Prescriptions Disp Refills    apixaban (Eliquis) 5 mg tablet 180 Tab 0     Sig: Take 1 Tab by mouth two (2) times a day.

## 2020-05-13 ENCOUNTER — HOSPITAL ENCOUNTER (OUTPATIENT)
Dept: GENERAL RADIOLOGY | Age: 65
Discharge: HOME | End: 2020-05-13
Payer: MEDICARE

## 2020-05-13 ENCOUNTER — HOME HEALTH ADMISSION (OUTPATIENT)
Dept: HOME HEALTH SERVICES | Facility: HOME HEALTH | Age: 65
End: 2020-05-13

## 2020-05-13 DIAGNOSIS — R06.02 SOB (SHORTNESS OF BREATH): ICD-10-CM

## 2020-05-13 DIAGNOSIS — J90 PLEURAL EFFUSION: ICD-10-CM

## 2020-05-13 DIAGNOSIS — R05.9 COUGH: ICD-10-CM

## 2020-05-13 PROBLEM — G89.3 CANCER ASSOCIATED PAIN: Status: ACTIVE | Noted: 2020-05-13

## 2020-05-13 PROCEDURE — 71046 X-RAY EXAM CHEST 2 VIEWS: CPT

## 2020-05-18 ENCOUNTER — HOSPITAL ENCOUNTER (INPATIENT)
Age: 65
LOS: 3 days | Discharge: HOME HEALTH | DRG: 181 | End: 2020-05-21
Attending: HOSPITALIST | Admitting: INTERNAL MEDICINE
Payer: MEDICARE

## 2020-05-18 DIAGNOSIS — J91.0 MALIGNANT PLEURAL EFFUSION: Chronic | ICD-10-CM

## 2020-05-18 DIAGNOSIS — G89.3 CANCER ASSOCIATED PAIN: Primary | Chronic | ICD-10-CM

## 2020-05-18 DIAGNOSIS — C34.00 MALIGNANT NEOPLASM OF HILUS OF LUNG, UNSPECIFIED LATERALITY (HCC): Chronic | ICD-10-CM

## 2020-05-18 DIAGNOSIS — R09.02 HYPOXIA: ICD-10-CM

## 2020-05-18 PROCEDURE — 74011250637 HC RX REV CODE- 250/637: Performed by: FAMILY MEDICINE

## 2020-05-18 PROCEDURE — 65660000000 HC RM CCU STEPDOWN

## 2020-05-18 RX ORDER — BISACODYL 5 MG
5 TABLET, DELAYED RELEASE (ENTERIC COATED) ORAL DAILY PRN
Status: DISCONTINUED | OUTPATIENT
Start: 2020-05-18 | End: 2020-05-21 | Stop reason: HOSPADM

## 2020-05-18 RX ORDER — ACETAMINOPHEN 325 MG/1
650 TABLET ORAL
Status: DISCONTINUED | OUTPATIENT
Start: 2020-05-18 | End: 2020-05-19

## 2020-05-18 RX ORDER — LIDOCAINE 4 G/100G
1 PATCH TOPICAL EVERY 24 HOURS
Status: DISCONTINUED | OUTPATIENT
Start: 2020-05-18 | End: 2020-05-21 | Stop reason: HOSPADM

## 2020-05-18 RX ORDER — SODIUM CHLORIDE 0.9 % (FLUSH) 0.9 %
5-40 SYRINGE (ML) INJECTION AS NEEDED
Status: DISCONTINUED | OUTPATIENT
Start: 2020-05-18 | End: 2020-05-21 | Stop reason: HOSPADM

## 2020-05-18 RX ORDER — ONDANSETRON 4 MG/1
4 TABLET, ORALLY DISINTEGRATING ORAL
Status: DISCONTINUED | OUTPATIENT
Start: 2020-05-18 | End: 2020-05-21 | Stop reason: HOSPADM

## 2020-05-18 RX ORDER — SODIUM CHLORIDE 0.9 % (FLUSH) 0.9 %
5-40 SYRINGE (ML) INJECTION EVERY 8 HOURS
Status: DISCONTINUED | OUTPATIENT
Start: 2020-05-18 | End: 2020-05-21 | Stop reason: HOSPADM

## 2020-05-18 RX ORDER — LIDOCAINE 4 G/100G
1 PATCH TOPICAL EVERY 24 HOURS
Status: DISCONTINUED | OUTPATIENT
Start: 2020-05-19 | End: 2020-05-18

## 2020-05-18 RX ORDER — LIDOCAINE 4 G/100G
1 PATCH TOPICAL EVERY 12 HOURS
Status: DISCONTINUED | OUTPATIENT
Start: 2020-05-18 | End: 2020-05-18

## 2020-05-18 RX ORDER — LEVOTHYROXINE SODIUM 88 UG/1
88 TABLET ORAL
Status: DISCONTINUED | OUTPATIENT
Start: 2020-05-19 | End: 2020-05-21 | Stop reason: HOSPADM

## 2020-05-18 RX ORDER — DIPHENHYDRAMINE HCL 25 MG
25 CAPSULE ORAL
Status: DISCONTINUED | OUTPATIENT
Start: 2020-05-18 | End: 2020-05-21 | Stop reason: HOSPADM

## 2020-05-18 RX ADMIN — Medication 10 ML: at 23:39

## 2020-05-18 RX ADMIN — ACETAMINOPHEN 650 MG: 325 TABLET, FILM COATED ORAL at 23:35

## 2020-05-19 PROBLEM — J91.0 PLEURAL EFFUSION, MALIGNANT: Status: ACTIVE | Noted: 2020-05-19

## 2020-05-19 PROBLEM — C34.90 MALIGNANT NEOPLASM OF LUNG (HCC): Chronic | Status: ACTIVE | Noted: 2020-04-18

## 2020-05-19 PROBLEM — G89.3 CANCER ASSOCIATED PAIN: Chronic | Status: ACTIVE | Noted: 2020-05-13

## 2020-05-19 PROBLEM — J91.0 MALIGNANT PLEURAL EFFUSION: Chronic | Status: ACTIVE | Noted: 2020-04-18

## 2020-05-19 LAB
ANION GAP SERPL CALC-SCNC: 6 MMOL/L (ref 7–16)
BUN SERPL-MCNC: 8 MG/DL (ref 8–23)
CALCIUM SERPL-MCNC: 9.8 MG/DL (ref 8.3–10.4)
CHLORIDE SERPL-SCNC: 106 MMOL/L (ref 98–107)
CO2 SERPL-SCNC: 28 MMOL/L (ref 21–32)
CREAT SERPL-MCNC: 0.87 MG/DL (ref 0.6–1)
ERYTHROCYTE [DISTWIDTH] IN BLOOD BY AUTOMATED COUNT: 16.2 % (ref 11.9–14.6)
GLUCOSE SERPL-MCNC: 109 MG/DL (ref 65–100)
HCT VFR BLD AUTO: 35.5 % (ref 35.8–46.3)
HGB BLD-MCNC: 11.3 G/DL (ref 11.7–15.4)
MCH RBC QN AUTO: 24.2 PG (ref 26.1–32.9)
MCHC RBC AUTO-ENTMCNC: 31.8 G/DL (ref 31.4–35)
MCV RBC AUTO: 76 FL (ref 79.6–97.8)
NRBC # BLD: 0 K/UL (ref 0–0.2)
PLATELET # BLD AUTO: 268 K/UL (ref 150–450)
PMV BLD AUTO: 10.3 FL (ref 9.4–12.3)
POTASSIUM SERPL-SCNC: 3.5 MMOL/L (ref 3.5–5.1)
RBC # BLD AUTO: 4.67 M/UL (ref 4.05–5.2)
SODIUM SERPL-SCNC: 140 MMOL/L (ref 136–145)
WBC # BLD AUTO: 15.3 K/UL (ref 4.3–11.1)

## 2020-05-19 PROCEDURE — C1729 CATH, DRAINAGE: HCPCS | Performed by: INTERNAL MEDICINE

## 2020-05-19 PROCEDURE — 0W9930Z DRAINAGE OF RIGHT PLEURAL CAVITY WITH DRAINAGE DEVICE, PERCUTANEOUS APPROACH: ICD-10-PCS | Performed by: INTERNAL MEDICINE

## 2020-05-19 PROCEDURE — 74011250637 HC RX REV CODE- 250/637: Performed by: FAMILY MEDICINE

## 2020-05-19 PROCEDURE — 76040000026: Performed by: INTERNAL MEDICINE

## 2020-05-19 PROCEDURE — 99152 MOD SED SAME PHYS/QHP 5/>YRS: CPT | Performed by: INTERNAL MEDICINE

## 2020-05-19 PROCEDURE — 85027 COMPLETE CBC AUTOMATED: CPT

## 2020-05-19 PROCEDURE — 65660000000 HC RM CCU STEPDOWN

## 2020-05-19 PROCEDURE — 74011250637 HC RX REV CODE- 250/637: Performed by: INTERNAL MEDICINE

## 2020-05-19 PROCEDURE — 74011250636 HC RX REV CODE- 250/636: Performed by: INTERNAL MEDICINE

## 2020-05-19 PROCEDURE — 75989 ABSCESS DRAINAGE UNDER X-RAY: CPT | Performed by: INTERNAL MEDICINE

## 2020-05-19 PROCEDURE — 77030022401 HC DRN WND TU/BULB PLUERX KT BD -B: Performed by: INTERNAL MEDICINE

## 2020-05-19 PROCEDURE — 32550 INSERT PLEURAL CATH: CPT | Performed by: INTERNAL MEDICINE

## 2020-05-19 PROCEDURE — 99153 MOD SED SAME PHYS/QHP EA: CPT | Performed by: INTERNAL MEDICINE

## 2020-05-19 PROCEDURE — 99223 1ST HOSP IP/OBS HIGH 75: CPT | Performed by: INTERNAL MEDICINE

## 2020-05-19 PROCEDURE — 36415 COLL VENOUS BLD VENIPUNCTURE: CPT

## 2020-05-19 PROCEDURE — 80048 BASIC METABOLIC PNL TOTAL CA: CPT

## 2020-05-19 RX ORDER — MIDAZOLAM HYDROCHLORIDE 1 MG/ML
.25-5 INJECTION, SOLUTION INTRAMUSCULAR; INTRAVENOUS
Status: DISCONTINUED | OUTPATIENT
Start: 2020-05-19 | End: 2020-05-19 | Stop reason: HOSPADM

## 2020-05-19 RX ORDER — SODIUM CHLORIDE 9 MG/ML
1000 INJECTION, SOLUTION INTRAVENOUS CONTINUOUS
Status: DISCONTINUED | OUTPATIENT
Start: 2020-05-19 | End: 2020-05-19 | Stop reason: HOSPADM

## 2020-05-19 RX ORDER — SENNOSIDES 8.6 MG/1
2 TABLET ORAL 2 TIMES DAILY
Status: DISCONTINUED | OUTPATIENT
Start: 2020-05-19 | End: 2020-05-21 | Stop reason: HOSPADM

## 2020-05-19 RX ORDER — FENTANYL CITRATE 50 UG/ML
25-100 INJECTION, SOLUTION INTRAMUSCULAR; INTRAVENOUS
Status: DISCONTINUED | OUTPATIENT
Start: 2020-05-19 | End: 2020-05-19 | Stop reason: HOSPADM

## 2020-05-19 RX ORDER — HYDROCODONE BITARTRATE AND ACETAMINOPHEN 5; 325 MG/1; MG/1
1 TABLET ORAL
Status: DISCONTINUED | OUTPATIENT
Start: 2020-05-19 | End: 2020-05-20 | Stop reason: SDUPTHER

## 2020-05-19 RX ADMIN — Medication 10 ML: at 05:24

## 2020-05-19 RX ADMIN — ACETAMINOPHEN 650 MG: 325 TABLET, FILM COATED ORAL at 11:05

## 2020-05-19 RX ADMIN — SODIUM CHLORIDE 250 ML: 900 INJECTION, SOLUTION INTRAVENOUS at 12:25

## 2020-05-19 RX ADMIN — MIDAZOLAM 2 MG: 1 INJECTION INTRAMUSCULAR; INTRAVENOUS at 15:36

## 2020-05-19 RX ADMIN — Medication 10 ML: at 21:51

## 2020-05-19 RX ADMIN — Medication 1 EACH: at 11:04

## 2020-05-19 RX ADMIN — FENTANYL CITRATE 50 MCG: 50 INJECTION, SOLUTION INTRAMUSCULAR; INTRAVENOUS at 15:36

## 2020-05-19 RX ADMIN — SENNOSIDES 17.2 MG: 8.6 TABLET, FILM COATED ORAL at 17:41

## 2020-05-19 RX ADMIN — Medication 10 ML: at 12:25

## 2020-05-19 RX ADMIN — SODIUM CHLORIDE 1000 ML: 900 INJECTION, SOLUTION INTRAVENOUS at 15:36

## 2020-05-19 RX ADMIN — HYDROCODONE BITARTRATE AND ACETAMINOPHEN 1 TABLET: 5; 325 TABLET ORAL at 18:30

## 2020-05-19 NOTE — PROGRESS NOTES
Pt complains of generalized pain 6/10. PRN Tylenol 650 mg given PO per MD order. Safety measures in place, call light within reach. Will continue to monitor.

## 2020-05-19 NOTE — PROGRESS NOTES
Care Management Interventions  PCP Verified by CM: Yes  Physical Therapy Consult: Yes  Occupational Therapy Consult: No  Speech Therapy Consult: No  Current Support Network: Lives with Spouse  Confirm Follow Up Transport: Family  Freedom of Choice List was Provided with Basic Dialogue that Supports the Patient's Individualized Plan of Care/Goals, Treatment Preferences and Shares the Quality Data Associated with the Providers?: Yes   Resource Information Provided?: No  Discharge Location  Discharge Placement: Unable to determine at this time  Patient lives with her  and son. Independent with ambulation but feels she may need a walker because she's very weak. PT consulted. Patient's  and sister assist with ADLs and drive patient to appointments. No home 02. Patient has no history of HH or rehab. Patient is requesting a new pcp. CM will reach out to [facility] to get patient assigned a pcp. CM following for discharge needs.

## 2020-05-19 NOTE — PROGRESS NOTES
TRANSFER - IN REPORT:    Verbal report received from Fritz Rodriguez, [facility] (name) on [employer]  being received from [address] [place] [address]) for routine progression of care      Report consisted of patients Situation, Background, Assessment and   Recommendations(SBAR). Information from the following report(s) SBAR, Kardex, Procedure Summary, Intake/Output, MAR and Recent Results was reviewed with the receiving nurse. Opportunity for questions and clarification was provided. Assessment completed upon patients arrival to unit and care assumed.

## 2020-05-19 NOTE — PROGRESS NOTES
TRANSFER - OUT REPORT:    Verbal report given to Willa ROGEL(name) on Sophia Singh  being transferred to [facility](unit) for routine post - op R pleurex catheter insertion and drainage. Report consisted of patients Situation, Background, Assessment and   Recommendations(SBAR). Information from the following report(s) SBAR, Procedure Summary and Recent Results was reviewed with the receiving nurse. Lines:   Peripheral IV 05/19/20 Anterior; Left Forearm (Active)    Opportunity for questions and clarification was provided.     Patient transported with:   O2 @ 2 liters

## 2020-05-19 NOTE — PROGRESS NOTES
Physical Therapy Note:    Physical therapy evaluation orders received and chart reviewed. Attempted to see patient } to initiate assessment. Patient currently off of the floor for procedure Will follow and re-attempt at a later time/date as schedule permits/patient available.      Annabella Werner, PT, DPT

## 2020-05-19 NOTE — PROGRESS NOTES
R PLEURAL CATHETER DRAINAGE INSTRUCTIONS:    DRAIN 500 ML FROM R PLEUREX FOR 7DAYS, THEN GO TO EVERY OTHER DAY DRAINING 500 ML FOR 7 DAYS. THEN DRAIN AS NEEDED FOR SHORTNESS OF BREATH. STOP IMMEDIATELY IF PATIENT DEVELOPS PAIN. HOME HEALTH TO REMOVE SUTURES AROUND CATHETER IN 10 DAYS. ELIQUIS MAY BE RESUMED AS BEFORE PROCEDURE STARTING TOMORROW.

## 2020-05-19 NOTE — PROGRESS NOTES
Pt complains of right sided chest pain 7/10. PRN Norco 5-325 mg one tab given po per MD order. Safety measures in place, call light within reach. Will continue to monitor.

## 2020-05-19 NOTE — PROGRESS NOTES
Pre-op prayer request received. Prayer and support given to patient. This was also an initial visit. Rev.  MATEO Everett

## 2020-05-19 NOTE — PROGRESS NOTES
05/18/20 2200   Dual Skin Pressure Injury Assessment   Dual Skin Pressure Injury Assessment WDL   Second Care Provider (Based on [address] [place] [address]) Venkata Negron RN   Skin Integumentary   Skin Integumentary (WDL) WDL

## 2020-05-19 NOTE — PROGRESS NOTES
Shift assessment complete. Pt alert and oriented x4 with flat affect. Respirations even and unlabored. Lung sounds diminished on supplemental 02 via NC. HR tachy. Abdomen soft with hypoactive bowel sounds heard in all four quadrants. Pt NPO, verbalizes understanding. Skin intact, no edema noted. IV patent and capped. All needs met at this time. Safety measures in place, call light within reach. Will continue to monitor.

## 2020-05-19 NOTE — PROGRESS NOTES
Patient from [address] [place] [address] . Patient arrived room 834 via stretcher . Accompanied by transport. Patient is oriented to room. Dual skin assessment completed with Rom Arndt RN. Patient skin is intact. No skin break down. Respirations are even and unlabored. O2 at 2lpm NC. Patient is encouraged to call for assistance.

## 2020-05-19 NOTE — H&P
HOSPITALIST H&P/CONSULT  NAME:  Delaney Burrell   Age:  71 y.o.  :   1955   MRN:   [id number]  PCP: Marcelina Schaumann, MD  Consulting MD:  Treatment Team: Attending Provider: Diaz Ken MD  HPI:   Patient is a 69GT with recent diagnosis lung cancer with recurrent malignant pleural effusion presents as transfer from [facility] for painful effusion, shortness of breath, and hypoxia. Large effusion on CT. Pulmonology had a scheduled pleurex planned next week, will be moved up due to symptoms. Last dose eliquis was yesterday. No cough or fever, pain is better on supplemental oxygen. No n/v. Complete ROS done and is as stated in HPI or otherwise negative  Past Medical History:   Diagnosis Date    Adverse effect of anesthesia     slow to wake up    Benign essential hypertension 2020    Diagnosed in her 29's    Complete hearing loss of right ear 2020    1982, s/p trauma w/ resultant surgery ending in complete hearing loss    Lymphadenopathy of right cervical region     Malignant neoplasm of lung ([address]     Postoperative hypothyroidism 2020    S/p total thyroidectomy 2015 for nodular/cystic goiter, benign    S/P total thyroidectomy 2020    Surgery 2015 for Multinodular Goiter, Cystic masses, Benign      Past Surgical History:   Procedure Laterality Date    HX BREAST BIOPSY Left     Benign Cyst Removed    HX  SECTION  ,     HX COLONOSCOPY  2015    Normal done in [place], repeat in 5 yrs due to family hx    HX CYST REMOVAL      Skin on back of neck    HX HEENT      Right Ear Surgery, Traumatic Scars    HX PARTIAL HYSTERECTOMY  in her 42's    Uterine Fibroids, Left Ovarian Cysts-Left Oopherectomy    HX THYROIDECTOMY  2015    Cystic Goiter    IR FINE NEEDLE ASPIRATION THYROID  2013    Benign Cysts    reviewed  Prior to Admission Medications   Prescriptions Last Dose Informant Patient Reported? Taking?    Dyazide 37.5-25 mg per capsule Unknown at Unknown time  Yes No   Sig: Take 1 Cap by mouth daily. HYDROcodone-acetaminophen (NORCO) 5-325 mg per tablet Not Taking at Unknown time  No No   Sig: Take 0.5 Tabs by mouth every eight (8) hours as needed for Pain for up to 7 days. Max Daily Amount: 1.5 Tabs. Synthroid 88 mcg tablet Unknown at Unknown time  Yes No   Sig: Take 1 Tab by mouth Daily (before breakfast). -Take 1 tab once a day on Mon-Thur  -Take 1/2 tab on Fri  -Skip on Sat & Sun   acetaminophen (Tylenol Extra Strength) 500 mg tablet Unknown at Unknown time  Yes No   Sig: Take  by mouth every six (6) hours as needed for Pain. alectinib 150 mg cap Unknown at Unknown time  No No   Sig: Take 4 Caps by mouth two (2) times a day. Indications: ALK positive non-small cell lung cancer   apixaban (Eliquis) 5 mg tablet 2020 at Unknown time  No Yes   Sig: Take 1 Tab by mouth two (2) times a day. lidocaine (LIDODERM) 5 % Not Taking at Unknown time  No No   Si Patch by TransDERmal route every twelve (12) hours for 30 days. Apply patch to the affected area for 12 hours a day and remove for 12 hours a day.       Facility-Administered Medications: None     Allergies   Allergen Reactions    Latex Rash    Sulfa (Sulfonamide Antibiotics) Swelling     Facial swelling     Oxycodone Other (comments)     Hallucinations    Morphine Other (comments)     Hallucinations    Symbicort [Budesonide-Formoterol] Other (comments)     Hyper, insomnia, nightmares      Social History     Tobacco Use    Smoking status: Never Smoker    Smokeless tobacco: Never Used   Substance Use Topics    Alcohol use: Not Currently      Family History   Problem Relation Age of Onset    Lung Cancer Mother          age 68, former smoker    Breast Cancer Mother         diagnosed w/ breast cancer in her late 50's/early 63's, surgery, survivor     Hypertension Mother     Prostate Cancer Father          in his early [de-identified]    Hypertension Sister    Bev Marte Diabetes Sister     Breast Cancer Sister         diagnosed in her early 42's, treated w/ radiation and surgery    Prostate Cancer Brother         survivor    Colon Cancer Brother         diagnosed in his 52's    Asthma Son     No Known Problems Daughter     Hypertension Sister     High Cholesterol Sister     Hypertension Brother     Obesity Brother     No Known Problems Brother     Heart Disease Brother     reviewed  Objective:     Visit Vitals  /69   Pulse 97   Temp 98.4 °F (36.9 °C)   Resp 18   SpO2 99%      Temp (24hrs), Av.4 °F (36.9 °C), Min:98.4 °F (36.9 °C), Max:98.4 °F (36.9 °C)    Oxygen Therapy  O2 Sat (%): 99 % (20)  O2 Device: Nasal cannula (20)  O2 Flow Rate (L/min): 2 l/min (20)  Physical Exam:  General:    Alert, cooperative, no distress, appears stated age. Head:   Normocephalic, without obvious abnormality, atraumatic. Nose:  Nares normal. No drainage or sinus tenderness. Lungs:   Diminished bases  Heart:   Regular rate and rhythm,  no murmur, rub or gallop. Abdomen:   Soft, non-tender. Not distended. Bowel sounds normal.   Extremities: No cyanosis. No edema. No clubbing  Skin:     Texture, turgor normal. No rashes or lesions. Not Jaundiced  Neurologic: Alert and oriented x 3, no focal deficits     Data Review:   No results found for this or any previous visit (from the past 24 hour(s)). Imaging /Procedures /Studies   No orders to display       Assessment and Plan:      Active Hospital Problems    Diagnosis Date Noted    Hypoxia 2020    Malignant pleural effusion 2020    Malignant neoplasm of lung ([facility] [address] 2020       PLAN:  Malignant pleural effusion  pulm consult for tap/pleurex catheter  Continue to hold eliquis    DVT PPx: anticoagulated  Code Status: full    Anticipated discharge: 2-3d    Signed By: Kemi Montemayor MD     May 18, 2020

## 2020-05-19 NOTE — INTERVAL H&P NOTE
Update History & Physical 
Date of Surgery Update: Marivel Tobin was seen and examined. History and physical has been reviewed. The patient has been examined.  There have been no significant clinical changes since the completion of the originally dated History and Physical. 
 
Signed By: Harleen Stevens MD   
 May 19, 2020 3:31 PM   
  
 
 
 
 
Electronically signed by Harleen Stevens MD on 5/19/2020 at 3:30 PM

## 2020-05-19 NOTE — PROGRESS NOTES
Hospitalist Note     Admit Date:  2020  9:56 PM   Name:  Livan Villanueva   Age:  71 y.o.  :  1955   MRN:  [id number]   PCP:  Jarocho Angela MD  Treatment Team: Attending Provider: Lele Mcelroy MD; Consulting Provider: Latha Zhao MD; Care Manager: Chema Peña.; Physical Therapist: Chino Brady PT; Utilization Review: Eliu Gates    HPI/Subjective:   Ms. Sharmila Araujo is a very nice 72 y/o AAF with a h/o hypothyroidism who was recently diagnosed with adenocarcinoma of the lung. She was planned for outpatient PleurX placement but became very SOB on  so she presented to [address] CT neg for PE but showed a large right sided effusion so she was transferred here. Pulm consulted, right sided PleurX drain was placed on .     : Anxious. PleurX to be placed today. SOB but ok on 2L. No other complaints  Objective:     Patient Vitals for the past 24 hrs:   Temp Pulse Resp BP SpO2   20 0800 98.8 °F (37.1 °C) (!) 104 18 143/83 96 %   20 0424 98.7 °F (37.1 °C) 94 18 139/69 98 %   20 0000 98.5 °F (36.9 °C) 94 18 128/65 98 %   20 98.4 °F (36.9 °C) 97 18 151/69 99 %     Oxygen Therapy  O2 Sat (%): 96 % (20 0800)  O2 Device: Nasal cannula (20)  O2 Flow Rate (L/min): 2 l/min (20)    Estimated body mass index is 27.29 kg/m² as calculated from the following:    Height as of 20: 5' 5\" (1.651 m). Weight as of 20: 74.4 kg (164 lb). No intake or output data in the 24 hours ending 20 1008    *Note that automatically entered I/Os may not be accurate; dependent on patient compliance with collection and accurate  by techs. General:    Well nourished. Alert. CV:   RRR. No murmur, rub, or gallop. Lungs:   Decreased at bases. 2L O2. No distress. Clear otherwise. Abdomen:   Soft, nontender, nondistended. Extremities: Warm and dry. No cyanosis or edema.    Skin:     No rashes or jaundice. Neuro:  No gross focal deficits    Data Review:  I have reviewed all labs, meds, and studies from the last 24 hours:  Recent Results (from the past 24 hour(s))   CBC W/O DIFF    Collection Time: 05/19/20  5:39 AM   Result Value Ref Range    WBC 15.3 (H) 4.3 - 11.1 K/uL    RBC 4.67 4.05 - 5.2 M/uL    HGB 11.3 (L) 11.7 - 15.4 g/dL    HCT 35.5 (L) 35.8 - 46.3 %    MCV 76.0 (L) 79.6 - 97.8 FL    MCH 24.2 (L) 26.1 - 32.9 PG    MCHC 31.8 31.4 - 35.0 g/dL    RDW 16.2 (H) 11.9 - 14.6 %    PLATELET 529 099 - 703 K/uL    MPV 10.3 9.4 - 12.3 FL    ABSOLUTE NRBC 0.00 0.0 - 0.2 K/uL   METABOLIC PANEL, BASIC    Collection Time: 05/19/20  5:39 AM   Result Value Ref Range    Sodium 140 136 - 145 mmol/L    Potassium 3.5 3.5 - 5.1 mmol/L    Chloride 106 98 - 107 mmol/L    CO2 28 21 - 32 mmol/L    Anion gap 6 (L) 7 - 16 mmol/L    Glucose 109 (H) 65 - 100 mg/dL    BUN 8 8 - 23 MG/DL    Creatinine 0.87 0.6 - 1.0 MG/DL    GFR est AA >60 >60 ml/min/1.73m2    GFR est non-AA >60 >60 ml/min/1.73m2    Calcium 9.8 8.3 - 10.4 MG/DL        Current Meds:  Current Facility-Administered Medications   Medication Dose Route Frequency    acetaminophen (TYLENOL) tablet 650 mg  650 mg Oral Q6H PRN    levothyroxine (SYNTHROID) tablet 88 mcg  88 mcg Oral ACB    sodium chloride (NS) flush 5-40 mL  5-40 mL IntraVENous Q8H    sodium chloride (NS) flush 5-40 mL  5-40 mL IntraVENous PRN    diphenhydrAMINE (BENADRYL) capsule 25 mg  25 mg Oral Q4H PRN    ondansetron (ZOFRAN ODT) tablet 4 mg  4 mg Oral Q4H PRN    bisacodyL (DULCOLAX) tablet 5 mg  5 mg Oral DAILY PRN    lidocaine 4 % patch 1 Patch  1 Patch TransDERmal Q24H       Other Studies:  No results found for this visit on 05/18/20. No results found.     All Micro Results     None          SARS-CoV-2 Lab Results  \"Novel Coronavirus\" Test: No results found for: COV2NT   \"Emergent Disease\" Test: No results found for: EDPR  \"SARS-COV-2\" Test: No results found for: XGCOVT  As of: 10:08 AM on 5/19/2020        Assessment and Plan:     Hospital Problems as of 5/19/2020 Date Reviewed: 5/13/2020          Codes Class Noted - Resolved POA    Pleural effusion, malignant ICD-10-CM: J91.0  ICD-9-CM: 511.81  5/19/2020 - Present Unknown        Hypoxia ICD-10-CM: R09.02  ICD-9-CM: 799.02  5/18/2020 - Present Unknown        * (Principal) Malignant pleural effusion ICD-10-CM: J91.0  ICD-9-CM: 511.81  4/18/2020 - Present Yes        Malignant neoplasm of lung ([facility] [address] ICD-10-CM: C34.90  ICD-9-CM: 162.9  4/18/2020 - Present Yes              Plan:  # Bilateral pleural effusions, malignant   - CT from [facility] reviewed. Large on right, mod on left. Likely metastatic disease/spread given recent dx of lung cancer. Unclear degree of hypoxia at [facility]. Currently stable and normal sats on 2L O2. Pulm consult. PleurX drain today. Eliquis held. # Lung cancer, adenocarcinoma   - Pulm/Onc outpatient f/u   - Drain for effusions as above    # Constipation   - Start bowel regimen following procedure. Enemas prn. # Hypothyroidism    - Synthroid    DC planning/Dispo: Home when able.    Diet:  DIET NPO  DVT ppx: Ambulation (Eliquis held pre-procedure)    Signed:  [place] MD

## 2020-05-19 NOTE — CONSULTS
PULMONARY/CCM CONSULT :  2020    Date of Admission:  2020    The patient's chart has been reviewed and the chart has been discussed with nursing staff. Subjective: This patient has been seen and evaluated at the request of Dr. Brandie Sanchez. Patient is a 71 y. o.female presents as a transfer from [facility] for SOB and hypoxemia. She has recent diagnosis lung cancer with recurrent malignant RIGHT pleural effusion. She was seen by us on  and plans were made or PleurX catheter placement. She was pre-op tested for Covid 19 and was negative. Last thoracenteses were 2020 with 450 cc removed and repeat procedure on 2020 with 950 cc removed. Both procedures were complicated by significant initial pain and vasovagal response with bradycardia and hypotension. She is currently on 2 lpm with sat of 96%. CXR on  with right effusion, decreased. Last dose of eliquis on . We were asked to see her for malignant pleural effusion.     Past Surgical History:   Procedure Laterality Date    HX BREAST BIOPSY Left     Benign Cyst Removed    HX  SECTION  ,     HX COLONOSCOPY  2015    Normal done in [place], repeat in 5 yrs due to family hx    HX CYST REMOVAL      Skin on back of neck    HX HEENT      Right Ear Surgery, Traumatic Scars    HX PARTIAL HYSTERECTOMY  in her 42's    Uterine Fibroids, Left Ovarian Cysts-Left Oopherectomy    HX THYROIDECTOMY  2015    Cystic Goiter    IR FINE NEEDLE ASPIRATION THYROID  2013    Benign Cysts      Social History     Tobacco Use    Smoking status: Never Smoker    Smokeless tobacco: Never Used   Substance Use Topics    Alcohol use: Not Currently      Family History   Problem Relation Age of Onset    Lung Cancer Mother          age 68, former smoker    Breast Cancer Mother         diagnosed w/ breast cancer in her late 50's/early 63's, surgery, survivor     Hypertension Mother     Prostate Cancer Father          in his early [de-identified]    Hypertension Sister     Diabetes Sister     Breast Cancer Sister         diagnosed in her early 42's, treated w/ radiation and surgery    Prostate Cancer Brother         survivor    Colon Cancer Brother         diagnosed in his 52's    Asthma Son     No Known Problems Daughter     Hypertension Sister     High Cholesterol Sister     Hypertension Brother     Obesity Brother     No Known Problems Brother     Heart Disease Brother       Allergies   Allergen Reactions    Latex Rash    Sulfa (Sulfonamide Antibiotics) Swelling     Facial swelling     Oxycodone Other (comments)     Hallucinations    Morphine Other (comments)     Hallucinations    Symbicort [Budesonide-Formoterol] Other (comments)     Hyper, insomnia, nightmares      Prior to Admission Medications   Prescriptions Last Dose Informant Patient Reported? Taking? Dyazide 37.5-25 mg per capsule Unknown at Unknown time  Yes No   Sig: Take 1 Cap by mouth daily. HYDROcodone-acetaminophen (NORCO) 5-325 mg per tablet Not Taking at Unknown time  No No   Sig: Take 0.5 Tabs by mouth every eight (8) hours as needed for Pain for up to 7 days. Max Daily Amount: 1.5 Tabs. Synthroid 88 mcg tablet Unknown at Unknown time  Yes No   Sig: Take 1 Tab by mouth Daily (before breakfast). -Take 1 tab once a day on Mon-Thur  -Take 1/2 tab on Fri  -Skip on Sat & Sun   acetaminophen (Tylenol Extra Strength) 500 mg tablet Unknown at Unknown time  Yes No   Sig: Take  by mouth every six (6) hours as needed for Pain. alectinib 150 mg cap Unknown at Unknown time  No No   Sig: Take 4 Caps by mouth two (2) times a day. Indications: ALK positive non-small cell lung cancer   apixaban (Eliquis) 5 mg tablet 2020 at Unknown time  No Yes   Sig: Take 1 Tab by mouth two (2) times a day. lidocaine (LIDODERM) 5 % Not Taking at Unknown time  No No   Si Patch by TransDERmal route every twelve (12) hours for 30 days.  Apply patch to the affected area for 12 hours a day and remove for 12 hours a day. Facility-Administered Medications: None       MEDS SCHEDULED:    Current Facility-Administered Medications   Medication Dose Route Frequency    senna (SENOKOT) tablet 17.2 mg  2 Tab Oral BID    acetaminophen (TYLENOL) tablet 650 mg  650 mg Oral Q6H PRN    levothyroxine (SYNTHROID) tablet 88 mcg  88 mcg Oral ACB    sodium chloride (NS) flush 5-40 mL  5-40 mL IntraVENous Q8H    sodium chloride (NS) flush 5-40 mL  5-40 mL IntraVENous PRN    diphenhydrAMINE (BENADRYL) capsule 25 mg  25 mg Oral Q4H PRN    ondansetron (ZOFRAN ODT) tablet 4 mg  4 mg Oral Q4H PRN    bisacodyL (DULCOLAX) tablet 5 mg  5 mg Oral DAILY PRN    lidocaine 4 % patch 1 Patch  1 Patch TransDERmal Q24H         Review of Systems  Constitutional: negative for fevers, chills, sweats and fatigue  Respiratory: positive for cough or pleurisy/chest pain  Cardiovascular: negative for chest pain, chest pressure/discomfort    Objective:     Vitals:    05/18/20 2117 05/19/20 0000 05/19/20 0424 05/19/20 0800   BP: 151/69 128/65 139/69 143/83   Pulse: 97 94 94 (!) 104   Resp: 18 18 18 18   Temp: 98.4 °F (36.9 °C) 98.5 °F (36.9 °C) 98.7 °F (37.1 °C) 98.8 °F (37.1 °C)   SpO2: 99% 98% 98% 96%     No intake/output data recorded. No intake/output data recorded. PHYSICAL EXAM     Physical Exam:   General:  Alert, cooperative. Eyes:  Conjunctivae/corneas clear. Nose: Nares patent and moist.    Mouth/Throat: Lips, mucosa, and tongue pink and intact. Neck: Supple, symmetrical.   Respiratory:   Dull left to auscultation bilaterally on 2 lpm   Cardiovascular:  Regular rate and rhythm, S1, S2, no murmur, click, rub or gallop. GI:   Abdomen soft, non-tender. Bowel sounds active X 4 Q. Musculoskeletal: Extremities symmetrical, atraumatic, no cyanosis, no edema. Pulses: 2+ and symmetric all extremities.    Skin: Skin color, texture, turgor normal. No rashes or lesions       Neurologic:   Alert and oriented. Activity: as tolerated  Nutrition: will order      CT chest result 5/18 Care everywhere  Impression:    1.  No evidence of pulmonary embolism or acute thoracic aortic injury. 2.  Large sized right pleural effusion and moderate sized left pleural effusion with bilateral atelectasis. 3.  Multiple bilateral pulmonary nodules, mediastinal/hilar adenopathy and bony lesions which are highly worrisome for tumor and metastatic disease. 4.  Other findings as discussed above. 5/13      CULTURES: none    LABS    Recent Labs     05/19/20  0539   WBC 15.3*   HGB 11.3*   HCT 35.5*        Recent Labs     05/19/20  0539      K 3.5      *   CO2 28   BUN 8   CREA 0.87         Assessment:     Hospital Problems  Date Reviewed: 5/13/2020          Codes Class Noted POA    Pleural effusion, malignant ICD-10-CM: J91.0  ICD-9-CM: 511.81  5/19/2020 Unknown        Hypoxia ICD-10-CM: R09.02  ICD-9-CM: 799.02  5/18/2020 Unknown        * (Principal) Malignant pleural effusion ICD-10-CM: J91.0  ICD-9-CM: 511.81  4/18/2020 Yes        Malignant neoplasm of lung (HCC) ICD-10-CM: C34.90  ICD-9-CM: 162.9  4/18/2020 Yes            Here with malignant pleural effusion and plan for PleurX    Plan:     Eliquis as taken 5/17, PleurX catheter placement Thursday per Dr Dino Wellington   -- eliquis is on hold  Advance diet  sats appropriate on 2 lpm  Control pain, taking tylenol   Had plans for see hem/onc tomorrow    LATIA Jeffers    More than 50% of time documented was spent in face-to-face contact with the patient and in the care of the patient on the floor/unit where the patient is located. I have spoken with and examined the patient. I agree with the above assessment and plan as documented. She has trouble with vasovagal responses with thoracentesis.     She is planned for pleur-x catheter today at 1pm.    Tm 99.2  WBC 15.3    Gen: pleasant, nervous about procedure, ON 2LPM  Lungs: decreased in bases  Heart:  RRR with no Murmur/Rubs/Gallops  Abd: ntnd  Ext: no edema      --will give small normal saline 250ml bolus preprocedure. Patient requests IV sedation and is currently NPO.   --Monitor for vagal response  --will follow postprocedure closely.     Tyrese Resendez MD

## 2020-05-19 NOTE — H&P (VIEW-ONLY)
PULMONARY/CCM CONSULT :  2020 Date of Admission:  2020 The patient's chart has been reviewed and the chart has been discussed with nursing staff. Subjective: This patient has been seen and evaluated at the request of Dr. Branide Sanchez. Patient is a 71 y. o.female presents as a transfer from [facility] for SOB and hypoxemia. She has recent diagnosis lung cancer with recurrent malignant RIGHT pleural effusion. She was seen by us on  and plans were made or PleurX catheter placement. She was pre-op tested for Covid 19 and was negative. Last thoracenteses were 2020 with 450 cc removed and repeat procedure on 2020 with 950 cc removed. Both procedures were complicated by significant initial pain and vasovagal response with bradycardia and hypotension. She is currently on 2 lpm with sat of 96%. CXR on  with right effusion, decreased. Last dose of eliquis on . We were asked to see her for malignant pleural effusion. Past Surgical History:  
Procedure Laterality Date  HX BREAST BIOPSY Left  Benign Cyst Removed [address] [facility]  HX COLONOSCOPY  2015 Normal done in [place], repeat in 5 yrs due to family hx  HX CYST REMOVAL   Skin on back of neck [address] Right Ear Surgery, Traumatic Scars  HX PARTIAL HYSTERECTOMY  in her 42's Uterine Fibroids, Left Ovarian Cysts-Left Oopherectomy  HX THYROIDECTOMY  2015 Cystic Goiter  IR FINE NEEDLE ASPIRATION THYROID  2013 Benign Cysts Social History Tobacco Use  Smoking status: Never Smoker  Smokeless tobacco: Never Used Substance Use Topics  Alcohol use: Not Currently Family History Problem Relation Age of Onset  Lung Cancer Mother   
      age 68, former smoker  Breast Cancer Mother   
     diagnosed w/ breast cancer in her late 50's/early 63's, surgery, survivor  Hypertension Mother  Prostate Cancer Father   
      in his early [de-identified]  Hypertension Sister  Diabetes Sister  Breast Cancer Sister   
     diagnosed in her early 42's, treated w/ radiation and surgery  Prostate Cancer Brother   
     survivor  Colon Cancer Brother   
     diagnosed in his 52's  Asthma Son  No Known Problems Daughter  Hypertension Sister  High Cholesterol Sister  Hypertension Brother  Obesity Brother  No Known Problems Brother  Heart Disease Brother Allergies Allergen Reactions  Latex Rash  Sulfa (Sulfonamide Antibiotics) Swelling Facial swelling  Oxycodone Other (comments) Hallucinations  Morphine Other (comments) Hallucinations  Symbicort [Budesonide-Formoterol] Other (comments) Hyper, insomnia, nightmares Prior to Admission Medications Prescriptions Last Dose Informant Patient Reported? Taking? Dyazide 37.5-25 mg per capsule Unknown at Unknown time  Yes No  
Sig: Take 1 Cap by mouth daily. HYDROcodone-acetaminophen (NORCO) 5-325 mg per tablet Not Taking at Unknown time  No No  
Sig: Take 0.5 Tabs by mouth every eight (8) hours as needed for Pain for up to 7 days. Max Daily Amount: 1.5 Tabs. Synthroid 88 mcg tablet Unknown at Unknown time  Yes No  
Sig: Take 1 Tab by mouth Daily (before breakfast). -Take 1 tab once a day on Mon-Thur 
-Take 1/2 tab on Fri 
-Skip on Sat & Sun  
acetaminophen (Tylenol Extra Strength) 500 mg tablet Unknown at Unknown time  Yes No  
Sig: Take  by mouth every six (6) hours as needed for Pain. alectinib 150 mg cap Unknown at Unknown time  No No  
Sig: Take 4 Caps by mouth two (2) times a day. Indications: ALK positive non-small cell lung cancer  
apixaban (Eliquis) 5 mg tablet 2020 at Unknown time  No Yes Sig: Take 1 Tab by mouth two (2) times a day.   
lidocaine (LIDODERM) 5 % Not Taking at Unknown time  No No  
 Si Patch by TransDERmal route every twelve (12) hours for 30 days. Apply patch to the affected area for 12 hours a day and remove for 12 hours a day. Facility-Administered Medications: None MEDS SCHEDULED:   
Current Facility-Administered Medications Medication Dose Route Frequency  senna (SENOKOT) tablet 17.2 mg  2 Tab Oral BID  acetaminophen (TYLENOL) tablet 650 mg  650 mg Oral Q6H PRN  
 levothyroxine (SYNTHROID) tablet 88 mcg  88 mcg Oral ACB  sodium chloride (NS) flush 5-40 mL  5-40 mL IntraVENous Q8H  
 sodium chloride (NS) flush 5-40 mL  5-40 mL IntraVENous PRN  
 diphenhydrAMINE (BENADRYL) capsule 25 mg  25 mg Oral Q4H PRN  
 ondansetron (ZOFRAN ODT) tablet 4 mg  4 mg Oral Q4H PRN  
 bisacodyL (DULCOLAX) tablet 5 mg  5 mg Oral DAILY PRN  
 lidocaine 4 % patch 1 Patch  1 Patch TransDERmal Q24H Review of Systems Constitutional: negative for fevers, chills, sweats and fatigue Respiratory: positive for cough or pleurisy/chest pain 
Cardiovascular: negative for chest pain, chest pressure/discomfort Objective:  
 
Vitals:  
 20 2117 20 0000 20 0424 20 0800 BP: 151/69 128/65 139/69 143/83 Pulse: 97 94 94 (!) 104 Resp:  Temp: 98.4 °F (36.9 °C) 98.5 °F (36.9 °C) 98.7 °F (37.1 °C) 98.8 °F (37.1 °C) SpO2: 99% 98% 98% 96% No intake/output data recorded. No intake/output data recorded. PHYSICAL EXAM  
 
Physical Exam:  
General:  Alert, cooperative. Eyes:  Conjunctivae/corneas clear. Nose: Nares patent and moist.   
Mouth/Throat: Lips, mucosa, and tongue pink and intact. Neck: Supple, symmetrical.  
Respiratory:   Dull left to auscultation bilaterally on 2 lpm  
Cardiovascular:  Regular rate and rhythm, S1, S2, no murmur, click, rub or gallop. GI:   Abdomen soft, non-tender. Bowel sounds active X 4 Q. Musculoskeletal: Extremities symmetrical, atraumatic, no cyanosis, no edema. Pulses: 2+ and symmetric all extremities. Skin: Skin color, texture, turgor normal. No rashes or lesions Neurologic:   Alert and oriented. Activity: as tolerated Nutrition: will order CT chest result 5/18 Care everywhere Impression: 1.  No evidence of pulmonary embolism or acute thoracic aortic injury. 2.  Large sized right pleural effusion and moderate sized left pleural effusion with bilateral atelectasis. 3.  Multiple bilateral pulmonary nodules, mediastinal/hilar adenopathy and bony lesions which are highly worrisome for tumor and metastatic disease. 4.  Other findings as discussed above. 5/13 CULTURES: none LABS Recent Labs 05/19/20 
7650 WBC 15.3* HGB 11.3* HCT 35.5*  Recent Labs 05/19/20 
1433   
K 3.5  * CO2 28 BUN 8  
CREA 0.87 Assessment:  
 
Hospital Problems  Date Reviewed: 5/13/2020 Codes Class Noted POA Pleural effusion, malignant ICD-10-CM: J91.0 ICD-9-CM: 511.81  5/19/2020 Unknown Hypoxia ICD-10-CM: R09.02 
ICD-9-CM: 799.02  5/18/2020 Unknown * (Principal) Malignant pleural effusion ICD-10-CM: J91.0 ICD-9-CM: 511.81  4/18/2020 Yes Malignant neoplasm of lung (HCC) ICD-10-CM: C34.90 ICD-9-CM: 162.9  4/18/2020 Yes Here with malignant pleural effusion and plan for PleurX Plan:  
 
Eliquis as taken 5/17, PleurX catheter placement Thursday per Dr Suma Roblero 
 -- eliquis is on hold Advance diet 
sats appropriate on 2 lpm 
Control pain, taking tylenol Had plans for see hem/onc tomorrow LATIA Rangel More than 50% of time documented was spent in face-to-face contact with the patient and in the care of the patient on the floor/unit where the patient is located. I have spoken with and examined the patient. I agree with the above assessment and plan as documented.  She has trouble with vasovagal responses with thoracentesis. She is planned for pleur-x catheter today at 1pm. 
 
Tm 99.2 WBC 15.3 Gen: pleasant, nervous about procedure, ON 2LPM 
Lungs: decreased in bases Heart:  RRR with no Murmur/Rubs/Gallops Abd: ntnd Ext: no edema 
 
 
--will give small normal saline 250ml bolus preprocedure. Patient requests IV sedation and is currently NPO. --Monitor for vagal response 
--will follow postprocedure closely.  
 
Waldo Victoria MD

## 2020-05-20 ENCOUNTER — APPOINTMENT (OUTPATIENT)
Dept: GENERAL RADIOLOGY | Age: 65
DRG: 181 | End: 2020-05-20
Attending: INTERNAL MEDICINE
Payer: MEDICARE

## 2020-05-20 PROCEDURE — 74011250637 HC RX REV CODE- 250/637: Performed by: INTERNAL MEDICINE

## 2020-05-20 PROCEDURE — 71045 X-RAY EXAM CHEST 1 VIEW: CPT

## 2020-05-20 PROCEDURE — 65660000000 HC RM CCU STEPDOWN

## 2020-05-20 PROCEDURE — 99232 SBSQ HOSP IP/OBS MODERATE 35: CPT | Performed by: INTERNAL MEDICINE

## 2020-05-20 PROCEDURE — 74011250637 HC RX REV CODE- 250/637: Performed by: FAMILY MEDICINE

## 2020-05-20 PROCEDURE — 97530 THERAPEUTIC ACTIVITIES: CPT

## 2020-05-20 PROCEDURE — 97110 THERAPEUTIC EXERCISES: CPT

## 2020-05-20 PROCEDURE — 97161 PT EVAL LOW COMPLEX 20 MIN: CPT

## 2020-05-20 RX ORDER — HYDROCODONE BITARTRATE AND ACETAMINOPHEN 5; 325 MG/1; MG/1
1 TABLET ORAL
Status: DISCONTINUED | OUTPATIENT
Start: 2020-05-20 | End: 2020-05-21 | Stop reason: HOSPADM

## 2020-05-20 RX ORDER — MAG HYDROX/ALUMINUM HYD/SIMETH 200-200-20
30 SUSPENSION, ORAL (FINAL DOSE FORM) ORAL
Status: DISCONTINUED | OUTPATIENT
Start: 2020-05-20 | End: 2020-05-21 | Stop reason: HOSPADM

## 2020-05-20 RX ORDER — TRIAMTERENE/HYDROCHLOROTHIAZID 37.5-25 MG
1 TABLET ORAL DAILY
Status: DISCONTINUED | OUTPATIENT
Start: 2020-05-20 | End: 2020-05-21 | Stop reason: HOSPADM

## 2020-05-20 RX ORDER — POLYETHYLENE GLYCOL 3350 17 G/17G
17 POWDER, FOR SOLUTION ORAL DAILY
Status: DISCONTINUED | OUTPATIENT
Start: 2020-05-20 | End: 2020-05-21 | Stop reason: HOSPADM

## 2020-05-20 RX ADMIN — Medication 10 ML: at 15:40

## 2020-05-20 RX ADMIN — POLYETHYLENE GLYCOL 3350 17 G: 17 POWDER, FOR SOLUTION ORAL at 11:42

## 2020-05-20 RX ADMIN — LEVOTHYROXINE SODIUM 88 MCG: 88 TABLET ORAL at 07:49

## 2020-05-20 RX ADMIN — HYDROCODONE BITARTRATE AND ACETAMINOPHEN 1 TABLET: 5; 325 TABLET ORAL at 11:40

## 2020-05-20 RX ADMIN — Medication 10 ML: at 21:00

## 2020-05-20 RX ADMIN — APIXABAN 5 MG: 5 TABLET, FILM COATED ORAL at 18:02

## 2020-05-20 RX ADMIN — HYDROCODONE BITARTRATE AND ACETAMINOPHEN 1 TABLET: 5; 325 TABLET ORAL at 15:56

## 2020-05-20 RX ADMIN — SENNOSIDES 17.2 MG: 8.6 TABLET, FILM COATED ORAL at 08:28

## 2020-05-20 RX ADMIN — HYDROCODONE BITARTRATE AND ACETAMINOPHEN 1 TABLET: 5; 325 TABLET ORAL at 20:45

## 2020-05-20 RX ADMIN — Medication 10 ML: at 05:24

## 2020-05-20 RX ADMIN — HYDROCODONE BITARTRATE AND ACETAMINOPHEN 1 TABLET: 5; 325 TABLET ORAL at 06:02

## 2020-05-20 RX ADMIN — HYDROCODONE BITARTRATE AND ACETAMINOPHEN 1 TABLET: 5; 325 TABLET ORAL at 00:03

## 2020-05-20 RX ADMIN — TRIAMTERENE AND HYDROCHLOROTHIAZIDE 1 TABLET: 37.5; 25 TABLET ORAL at 15:40

## 2020-05-20 NOTE — PROGRESS NOTES
Hospitalist Note     Admit Date:  2020  9:56 PM   Name:  Alexys Diaz   Age:  71 y.o.  :  1955   MRN:  [id number]   PCP:  Vargas Chairez MD  Treatment Team: Attending Provider: Ana Palomares MD; Consulting Provider: Renzo Dhillon MD; Care Manager: Juan David Elise.; Utilization Review: Td Marc; Physical Therapist: Angel Paige PT    HPI/Subjective:   Ms. Dorotha Duverney is a very nice 70 y/o AAF with a h/o hypothyroidism who was recently diagnosed with adenocarcinoma of the lung. She was planned for outpatient PleurX placement but became very SOB on  so she presented to [address] CT neg for PE but showed a large right sided effusion so she was transferred here. Pulm consulted, right sided PleurX drain was placed on .     : Much better today, in better spirits. SOB is improved and she can ambulate more in the room with less symptoms. Still some pain at drain but overall improved.      No other complaints  Objective:     Patient Vitals for the past 24 hrs:   Temp Pulse Resp BP SpO2   20 0847 98.6 °F (37 °C) (!) 105 16 134/69 93 %   20 0324 98.1 °F (36.7 °C) 90 18 116/60 96 %   20 2355 98.5 °F (36.9 °C) 96 18 128/65 95 %   20 98.4 °F (36.9 °C) 90 18 123/68 96 %   20 1639  92 15 143/75 94 %   20 1636   17     20 1629  93 16 143/65 97 %   20 1619  95 17 128/57 97 %   20 1614  96 17 144/66 93 %   20 1609  98 16 139/63 98 %   20 1558  94 17 149/79 97 %   20 1554  (!) 107 16 151/79 97 %   20 1549  94 15 149/73 96 %   20 1544  95 16 144/70 96 %   20 1539  96 17 146/71 95 %   20 1536  100 17 155/75 96 %   20 1534  (!) 103 16 153/76 97 %   20 1524  (!) 103 20 159/79 98 %   20 1107 99.2 °F (37.3 °C) 99 18 146/89 98 %     Oxygen Therapy  O2 Sat (%): 93 % (20 0847)  Pulse via Oximetry: 92 beats per minute (20 1639)  O2 Device: Nasal cannula (05/20/20 0847)  O2 Flow Rate (L/min): 2 l/min (05/20/20 0847)    Estimated body mass index is 27.29 kg/m² as calculated from the following:    Height as of 5/13/20: 5' 5\" (1.651 m). Weight as of this encounter: 74.4 kg (164 lb). Intake/Output Summary (Last 24 hours) at 5/20/2020 1008  Last data filed at 5/19/2020 1639  Gross per 24 hour   Intake    Output 1500 ml   Net -1500 ml       *Note that automatically entered I/Os may not be accurate; dependent on patient compliance with collection and accurate  by techs. General:    Well nourished. Alert. CV:   RRR. No murmur, rub, or gallop. Lungs:   Decreased at both bases, R>L. Clear otherwise. 2L O2. PleurX drain on right. Abdomen:   Soft, nontender, nondistended. Extremities: Warm and dry. No cyanosis or edema. Skin:     No rashes or jaundice. Neuro:  No gross focal deficits    Data Review:  I have reviewed all labs, meds, and studies from the last 24 hours:  No results found for this or any previous visit (from the past 24 hour(s)). Current Meds:  Current Facility-Administered Medications   Medication Dose Route Frequency    senna (SENOKOT) tablet 17.2 mg  2 Tab Oral BID    lip protectant (BLISTEX) ointment 1 Each  1 Each Topical PRN    HYDROcodone-acetaminophen (NORCO) 5-325 mg per tablet 1 Tab  1 Tab Oral Q6H PRN    levothyroxine (SYNTHROID) tablet 88 mcg  88 mcg Oral ACB    sodium chloride (NS) flush 5-40 mL  5-40 mL IntraVENous Q8H    sodium chloride (NS) flush 5-40 mL  5-40 mL IntraVENous PRN    diphenhydrAMINE (BENADRYL) capsule 25 mg  25 mg Oral Q4H PRN    ondansetron (ZOFRAN ODT) tablet 4 mg  4 mg Oral Q4H PRN    bisacodyL (DULCOLAX) tablet 5 mg  5 mg Oral DAILY PRN    lidocaine 4 % patch 1 Patch  1 Patch TransDERmal Q24H       Other Studies:  No results found for this visit on 05/18/20. No results found.     All Micro Results     None          SARS-CoV-2 Lab Results  \"Novel Coronavirus\" Test: No results found for: COV2NT   \"Emergent Disease\" Test: No results found for: EDPR  \"SARS-COV-2\" Test: No results found for: XGCOVT  As of: 10:08 AM on 5/20/2020        Assessment and Plan:     Hospital Problems as of 5/20/2020 Date Reviewed: 5/19/2020          Codes Class Noted - Resolved POA    Hypoxia ICD-10-CM: R09.02  ICD-9-CM: 799.02  5/18/2020 - Present Yes        Cancer associated pain (Chronic) ICD-10-CM: G89.3  ICD-9-CM: 338.3  5/13/2020 - Present Yes        * (Principal) Malignant pleural effusion (Chronic) ICD-10-CM: J91.0  ICD-9-CM: 511.81  4/18/2020 - Present Yes        Malignant neoplasm of lung ([address] (Chronic) ICD-10-CM: C34.90  ICD-9-CM: 162.9  4/18/2020 - Present Yes              Plan:  # Bilateral pleural effusions, malignant              [place] [facility] [place] Large on right, mod on left. Likely metastatic disease/spread given recent dx of lung cancer. [place]clear degree [place] [facility] - Appreciate Pulm. PleurX drain placed on 5/19   - CXR tomorrow   - Can resume Eliquis tonight     # Lung cancer, adenocarcinoma              - Pulm/Onc outpatient f/u              - Drain for effusions as above     # Constipation              - Add miralax to Senna.     # Hypothyroidism                      - Synthroid    DC planning/Dispo: Home likely tomorrow. Diet:  DIET REGULAR  DVT ppx: Resume Eliquis tonight.     Signed:  Robinson Mendez MD

## 2020-05-20 NOTE — PROGRESS NOTES
Patient lying quietly in bed. Assessment completed. Respirations are even and unlabored. Dressing on right lower chest is intact and dry. Patient denies any pain. No distress at this manuelito. Bed is low, locked and call light within reach.

## 2020-05-20 NOTE — PROGRESS NOTES
Patient having right lower flank pain with a level of 9 and norco 5/325 mg given for at this time. Will assess patient pain medication effectiveness.

## 2020-05-20 NOTE — PROGRESS NOTES
Patient reporting intractable pain. Will have Cori Wilder at 11:30. She requests PT return at 1 pm.    Will continue with PT evaluation per patient pain toleration at that time. Sumi Chappell, PT, DPT, OCS.

## 2020-05-20 NOTE — PROGRESS NOTES
Patient medicated for a pain level of 7 (0-10) to the right flank after pleural drainage. Medicated with Norco 5/325 mg. Will continue to follow.

## 2020-05-20 NOTE — PROGRESS NOTES
Problem: Mobility Impaired (Adult and Pediatric)  Goal: *Therapy Goal (Edit Goal, Insert Text)  Outcome: Progressing Towards Goal  Note: STG:  (1.)Ms. Madrid will move from supine to sit and sit to supine , scoot up and down, and roll side to side with MODIFIED INDEPENDENCE within 4 treatment day(s). (2.)Ms. Madrid will transfer from bed to chair and chair to bed with MODIFIED INDEPENDENCE using the least restrictive device within 4 treatment day(s). (3.)Ms. Madrid will ambulate with MODIFIED INDEPENDENCE for 100 feet with the least restrictive device within 4 treatment day(s). LTG:  (1.)Ms. Madrid will move from supine to sit and sit to supine , scoot up and down, and roll side to side in bed with INDEPENDENT within 7 treatment day(s). (2.)Ms. Madrid will transfer from bed to chair and chair to bed with INDEPENDENT using the least restrictive device within 7 treatment day(s). (3.)Ms. Madrid will ambulate with INDEPENDENT for 200 feet with the least restrictive device within 7 treatment day(s). ________________________________________________________________________________________________    PHYSICAL THERAPY: Initial Assessment and PM 5/20/2020  INPATIENT: PT Visit Days : 1  Payor: Matthew Christensen / Plan: [address] [place], [place] [address] [employer] [employer] / Product Type: Spare Change Payments Care Medicare /       NAME/AGE/GENDER: Lyndsey King is a 71 y.o. female   PRIMARY DIAGNOSIS: Pleural effusion, malignant [J91.0] Malignant pleural effusion Malignant pleural effusion  Procedure(s) (LRB):  PLEURAL CATHETER INSERTION Contact HH for drainage schedule (Right)  ULTRASOUND (Right)  PLEURAL CATHETER DRAINAGE (Right)  1 Day Post-Op  ICD-10: Treatment Diagnosis:    · Generalized Muscle Weakness (M62.81)  · Other lack of cordination (R27.8)  · Difficulty in walking, Not elsewhere classified (R26.2)  · Other abnormalities of gait and mobility (R26.89)   Precaution/Allergies:  Latex; Sulfa (sulfonamide antibiotics);  Oxycodone; Morphine; and Symbicort [budesonide-formoterol]      ASSESSMENT:     Ms. Akilah Lau presents pleasantly post pain medication with negligible pain reported after sharp pain earlier this morning. All transfers are currently SBA x 1 in bed to EOB and seated dangling with good to occasionally fair balance. Then patient ambulates in the room for 40 feet with 2 liters 02 nasal cannula. No SOB. Her standing balance is FAIR to GOOD. Then she sits in the bedside chair and performs therapeutic exercise. Skilled PT is indicated for this patient functional mobility deficits. Improved overall. She is schedule for discharge to home on 5/21/2020. This section established at most recent assessment   PROBLEM LIST (Impairments causing functional limitations):  1. Decreased Strength  2. Decreased ADL/Functional Activities  3. Decreased Transfer Abilities  4. Decreased Ambulation Ability/Technique  5. Decreased Balance   INTERVENTIONS PLANNED: (Benefits and precautions of physical therapy have been discussed with the patient.)  1. Balance Exercise  2. Therapeutic Activites  3. Therapeutic Exercise/Strengthening     TREATMENT PLAN: Frequency/Duration: 4 times a week for duration of hospital stay  Rehabilitation Potential For Stated Goals: Good     REHAB RECOMMENDATIONS (at time of discharge pending progress):    Placement: It is my opinion, based on this patient's performance to date, that Ms. Akilah Lau may benefit from being discharged with NO further skilled therapy due to the high likelihood of returning to baseline. Equipment:    None at this time              HISTORY:   History of Present Injury/Illness (Reason for Referral): This patient has been seen and evaluated at the request of Dr. Sandhya Rodriguez. Patient is a 71 y. o.female presents as a transfer from [facility] for SOB and hypoxemia. She has recent diagnosis lung cancer with recurrent malignant RIGHT pleural effusion.  She was seen by us on 5/13 and plans were made or PleurX catheter placement. She was pre-op tested for Covid 19 and was negative. Last thoracenteses were 2020 with 450 cc removed and repeat procedure on 2020 with 950 cc removed. Both procedures were complicated by significant initial pain and vasovagal response with bradycardia and hypotension. She is currently on 2 lpm with sat of 96%. CXR on  with right effusion, decreased. Last dose of eliquis on . We were asked to see her for malignant pleural effusion. Past Medical History/Comorbidities:   Ms. Serafin Hernandez  has a past medical history of Adverse effect of anesthesia, Benign essential hypertension (2020), Complete hearing loss of right ear (2020), Lymphadenopathy of right cervical region, Malignant neoplasm of lung ([facility] [address] Postoperative hypothyroidism (2020), and S/P total thyroidectomy (2020). Ms. Serafin Hernandez  has a past surgical history that includes hx  section (, ); hx heent (); ir fine needle aspiration thyroid (); hx thyroidectomy (); hx partial hysterectomy (in her 41's); hx cyst removal (s); hx colonoscopy (); and hx breast biopsy (Left, ). Social History/Living Environment:   Home Environment: Private residence  One/Two Story Residence: One story  Living Alone: No  Support Systems: Family member(s), Friends \ neighbors  Patient Expects to be Discharged to[de-identified] Private residence  Current DME Used/Available at Home: None  Prior Level of Function/Work/Activity:  Had been independent with all functional mobility. Dominant Side:         RIGHT    Personal Factors:          Sex:  female        Age:  71 y.o.    Number of Personal Factors/Comorbidities that affect the Plan of Care: 0: LOW COMPLEXITY   EXAMINATION:   Most Recent Physical Functioning:   Gross Assessment:  AROM: Generally decreased, functional  Strength: Generally decreased, functional  Coordination: Generally decreased, functional  Tone: Normal  Sensation: Intact               Posture:  Posture (WDL): Exceptions to WDL  Posture Assessment: Cervical, Forward head  Balance:  Sitting: Intact  Standing: Impaired  Standing - Static: Good  Standing - Dynamic : Good;Fair Bed Mobility:  Rolling: Stand-by assistance  Supine to Sit: Stand-by assistance  Sit to Supine: Stand-by assistance  Scooting: Stand-by assistance  Wheelchair Mobility:     Transfers:  Sit to Stand: Stand-by assistance  Stand to Sit: Stand-by assistance  Bed to Chair: Stand-by assistance  Gait:     Base of Support: Center of gravity altered  Speed/Torie: Delayed; Fluctuations; Pace decreased (<100 feet/min); Shuffled; Slow  Step Length: Left shortened;Right shortened  Swing Pattern: Left asymmetrical;Right asymmetrical  Stance: Time;Weight shift  Gait Abnormalities: Decreased step clearance  Distance (ft): 40 Feet (ft)  Assistive Device: (none)  Ambulation - Level of Assistance: Stand-by assistance  Interventions: Manual cues; Safety awareness training; Tactile cues; Verbal cues; Visual/Demos      Body Structures Involved:  1. Bones  2. Joints  3. Muscles  4. Ligaments Body Functions Affected:  1. Neuromusculoskeletal  2. Movement Related  3. Skin Related  4. Metobolic/Endocrine Activities and Participation Affected:  1. General Tasks and Demands  2. Communication  3. Mobility  4. Self Care  5. Domestic Life  6. Community, Social and [place]   Number of elements that affect the Plan of Care: 1-2: LOW COMPLEXITY   CLINICAL PRESENTATION:   Presentation: Stable and uncomplicated: LOW COMPLEXITY   CLINICAL DECISION MAKIN [facility] Inpatient Short Form  How much difficulty does the patient currently have. .. Unable A Lot A Little None   1. Turning over in bed (including adjusting bedclothes, sheets and blankets)? [] 1   [] 2   [] 3   [x] 4   2.   Sitting down on and standing up from a chair with arms ( e.g., wheelchair, bedside commode, etc.)   [] 1 [] 2   [] 3   [x] 4   3. Moving from lying on back to sitting on the side of the bed? [] 1   [] 2   [] 3   [x] 4   How much help from another person does the patient currently need. .. Total A Lot A Little None   4. Moving to and from a bed to a chair (including a wheelchair)? [] 1   [] 2   [] 3   [x] 4   5. Need to walk in hospital room? [] 1   [] 2   [] 3   [x] 4   6. Climbing 3-5 steps with a railing? [] 1   [] 2   [x] 3   [] 4   © 2007, Trustees of [address] [place] [address] [address] [address], under license to [employer]. All rights reserved      Score:  Initial: 23 Most Recent: X (Date: -- )    Interpretation of Tool:  Represents activities that are increasingly more difficult (i.e. Bed mobility, Transfers, Gait). Medical Necessity:     · Patient demonstrates   · good  ·  rehab potential due to higher previous functional level. Reason for Services/Other Comments:  · Patient continues to require skilled intervention due to   · S/p procedure with slight debility and decreased mobility. · .   Use of outcome tool(s) and clinical judgement create a POC that gives a: Clear prediction of patient's progress: LOW COMPLEXITY            TREATMENT:   (In addition to Assessment/Re-Assessment sessions the following treatments were rendered)   Pre-treatment Symptoms/Complaints:  0/10 pain reported. Pain: Initial:   Pain Intensity 1: 0  Post Session:  no pain reported. Therapeutic Activity: (    15 mins): Therapeutic activities including Bed transfers, Chair transfers, and Ambulation on level ground to improve mobility, strength, balance, and coordination. Required minimal Manual cues; Safety awareness training; Tactile cues; Verbal cues; Visual/Demos to promote coordination of bilateral, lower extremity(s) and promote motor control of bilateral, lower extremity(s). Therapeutic Exercise: ( 8 mins):  Exercises per grid below to improve mobility, strength, balance, and coordination.   Required minimal visual, verbal, manual, and tactile cues to promote proper body alignment, promote proper body posture, and promote proper body mechanics. Progressed repetitions as indicated. Date:  5/20/2020  Date:   Date:     Activity/Exercise Parameters Parameters Parameters   AP's  20 x's     HIP ABD  20 x's      LAQ's  20 x's      AP's  20 x's                           Braces/Orthotics/Lines/Etc:   · O2 Device: Nasal cannula  Treatment/Session Assessment:    · Response to Treatment:  improved transfers and ambulation without SOB, stable in standing. · Interdisciplinary Collaboration:   o Physical Therapist  o Registered Nurse  · After treatment position/precautions:   o Up in chair  o Bed alarm/tab alert on  o Bed/Chair-wheels locked  o Bed in low position  o Call light within reach  o RN notified  o Side rails x 3   · Compliance with Program/Exercises: Will assess as treatment progresses  · Recommendations/Intent for next treatment session: \"Next visit will focus on advancements to more challenging activities, reduction in assistance provided, and transfers, ambulation and mobility. \".   Total Treatment Duration:  PT Patient Time In/Time Out  Time In: 1248  Time Out: [address] [email or web address]

## 2020-05-20 NOTE — PROGRESS NOTES
Called to assist with pts R pleurex drainage. 500ml joselyn colored pleural fluid drained from R pleurex with no complications. Site redressed appropriately and pt's Nursing staff instructed on how to drain catheter. Pt tolerated procedure well with no adverse rxn. Pts RN Nish Manzo made aware of pt requesting pain medication. Dr Bon Gongora wants Pleurex to be drained daily for 5 more days then every other day x 7 days then only when she feels like it needs to be.  This plan needs to be relayed to [address] [place] [address] Jorge Thompson when she is discharged.

## 2020-05-20 NOTE — PROGRESS NOTES
Nutrition Assessment for:   Best Practice Alert for Malnutrition Screening Tool: Recently Lost Weight Without Trying: Yes, If Yes, How Much Weight Loss: Unsure, Eating Poorly Due to Decreased Appetite: Yes  Consult for general nutrition management and supplements (Hospitalist)    Assessment: Patient with PMH of HTN and recent diagnosis of lung cancer. She was admitted for Pleur-X due to malignant pleural effusions and shortness of breath. She is now s/p Pleur-X yesterday. She was seen today with her lunch, eating soup. She states that she will probably eat a few more bites of soup and save her dessert for later. She reports some of eggs and 1/2 pancakes this am. She states that her appetite is not what it used to be and she stays full. She reports that her weight loss started back in February when her workup for new CA diagnosis. She state that usual body weight is ~182#. She declines Ensure as she had intolerance to Boost in the past. She is agreeable to trying Magic Cup between meals. DIET REGULAR      Anthropometrics:  Height: 65\", Weight: 74.4 kg (164 lb),  , Body mass index is 27.29 kg/m². BMI class of overweight. Weight history per outpatient office weights:  1/21/2020 82.6 kg (182#)  3/11/2020 82.7 kg (182#)  4/24/2020 77.7 kg (171#)  5/13/2020 74.4 kg (164#)  This reveals an 18# (9.9%) weight loss in last 2 months which is clinically signficant. Macronutrient needs: (using Listed body weight 74.4 kg)  EER: 2990-9326 kcal/day (20-25 kcal/kg)  EPR: 74-93 g/day (1-1.25 g/kg)    Nutrition Diagnosis:  Unintended weight loss related to poor appetite  as evidenced by patient reported barrier to PO intake, diet recacll, 9.9% weight loss in last 2 months.      Nutrition Intervention:  Meals and snacks: Continue current diet  Medical food supplement therapy: Add Magic Cups TID  Coordination of nutrition care by a Nutrition Professional: Discussed with JUAN F Doss  Discharge Nutrition Plan: Too soon to determine.     [address], [address]

## 2020-05-20 NOTE — PROGRESS NOTES
Problem: Nutrition Deficit  Goal: *Optimize nutritional status  Outcome: Progressing Towards Goal     Problem: Falls - Risk of  Goal: *Absence of Falls  Description: Document Danney Mess Fall Risk and appropriate interventions in the flowsheet.   Outcome: Progressing Towards Goal  Note: Fall Risk Interventions:  Mobility Interventions: OT consult for ADLs, Patient to call before getting OOB, PT Consult for mobility concerns         Medication Interventions: Patient to call before getting OOB    Elimination Interventions: Call light in reach       Problem: Patient Education: Go to Patient Education Activity  Goal: Patient/Family Education  Outcome: Progressing Towards Goal     Problem: Gas Exchange - Impaired  Goal: *Absence of hypoxia  Outcome: Progressing Towards Goal     Problem: Patient Education: Go to Patient Education Activity  Goal: Patient/Family Education  Outcome: Progressing Towards Goal

## 2020-05-20 NOTE — PROGRESS NOTES
Patient with increased pain after having pleurx drainage. Pain medication is currently Q6 hours prn and next dose not available for more than an hour. Spoke with Dr. Maureen Bacon and received order for Norco 5/325 mg Q4 hours prn moderate pain. Read back and verified. Will medicate as appropriate.

## 2020-05-20 NOTE — PROGRESS NOTES
Marin Mcfadden  Admission Date: 5/18/2020             Daily Progress Note: 5/20/2020    The patient's chart is reviewed and the patient is discussed with the staff. Marin Mcfadden is a 68yoF who was admitted to [address] [place] [address] with advanced stage lung cancer with malignant effusion. She underwent pleur-x catheter placement on 5/19/20 with instructions to drain 500ml daily. Subjective:     On 2lpm of oxygen this morning with sat 93%.     Current Facility-Administered Medications   Medication Dose Route Frequency    senna (SENOKOT) tablet 17.2 mg  2 Tab Oral BID    lip protectant (BLISTEX) ointment 1 Each  1 Each Topical PRN    HYDROcodone-acetaminophen (NORCO) 5-325 mg per tablet 1 Tab  1 Tab Oral Q6H PRN    levothyroxine (SYNTHROID) tablet 88 mcg  88 mcg Oral ACB    sodium chloride (NS) flush 5-40 mL  5-40 mL IntraVENous Q8H    sodium chloride (NS) flush 5-40 mL  5-40 mL IntraVENous PRN    diphenhydrAMINE (BENADRYL) capsule 25 mg  25 mg Oral Q4H PRN    ondansetron (ZOFRAN ODT) tablet 4 mg  4 mg Oral Q4H PRN    bisacodyL (DULCOLAX) tablet 5 mg  5 mg Oral DAILY PRN    lidocaine 4 % patch 1 Patch  1 Patch TransDERmal Q24H       Review of Systems  Constitutional: negative for fever, chills, sweats  Cardiovascular: negative for chest pain, palpitations, syncope, edema  Gastrointestinal:  negative for dysphagia, reflux, vomiting, diarrhea, abdominal pain, or melena  Neurologic:  negative for focal weakness, numbness, headache    Objective:     Vitals:    05/19/20 2008 05/19/20 2355 05/20/20 0324 05/20/20 0847   BP: 123/68 128/65 116/60 134/69   Pulse: 90 96 90 (!) 105   Resp: 18 18 18 16   Temp: 98.4 °F (36.9 °C) 98.5 °F (36.9 °C) 98.1 °F (36.7 °C) 98.6 °F (37 °C)   SpO2: 96% 95% 96% 93%   Weight:             Intake/Output Summary (Last 24 hours) at 5/20/2020 0913  Last data filed at 5/19/2020 1639  Gross per 24 hour   Intake    Output 1500 ml   Net -1500 ml       Physical Exam: Constitution:  the patient is well developed and in no acute distress  EENMT:  Sclera clear, pupils equal, oral mucosa moist  Respiratory: few bibasilar crackles. Pleur--x site c/d/i  Cardiovascular:  RRR without M,G,R  Gastrointestinal: soft and non-tender; with positive bowel sounds. Musculoskeletal: warm without cyanosis. There is no lower extremity edema. Skin:  no jaundice or rashes, no wounds   Neurologic: no gross neuro deficits     Psychiatric:  alert and oriented x 3      LAB  No results for input(s): GLUCPOC in the last 72 hours. No lab exists for component: GLPOC   Recent Labs     05/19/20  0539   WBC 15.3*   HGB 11.3*   HCT 35.5*        Recent Labs     05/19/20  0539      K 3.5      CO2 28   *   BUN 8   CREA 0.87   CA 9.8     No results for input(s): PH, PCO2, PO2, HCO3, PHI, PCO2I, PO2I, HCO3I in the last 72 hours. No results for input(s): LCAD, LAC in the last 72 hours. Assessment:  (Medical Decision Making)     Hospital Problems  Date Reviewed: 5/19/2020          Codes Class Noted POA    Hypoxia ICD-10-CM: R09.02  ICD-9-CM: 799.02  5/18/2020 Yes        Cancer associated pain (Chronic) ICD-10-CM: G89.3  ICD-9-CM: 338.3  5/13/2020 Yes        * (Principal) Malignant pleural effusion (Chronic) ICD-10-CM: J91.0  ICD-9-CM: 511.81  4/18/2020 Yes        Malignant neoplasm of lung (HCC) (Chronic) ICD-10-CM: C34.90  ICD-9-CM: 162.9  4/18/2020 Yes              Plan:  (Medical Decision Making)     --Needs pleur-x teaching. Drain 500ml daily. --CXR tomorrow  --continue pain control. --assess oxygen needs for home. --anticipate discharge tomorrow. More than 50% of the time documented was spent in face-to-face contact with the patient and in the care of the patient on the floor/unit where the patient is located.     Tyrese Resendez MD

## 2020-05-20 NOTE — PROGRESS NOTES
Patient having right flank pain and norco 5/325 given at this time for pain level of 9.   Will assess patient pain level

## 2020-05-21 ENCOUNTER — APPOINTMENT (OUTPATIENT)
Dept: GENERAL RADIOLOGY | Age: 65
DRG: 181 | End: 2020-05-21
Attending: INTERNAL MEDICINE
Payer: MEDICARE

## 2020-05-21 ENCOUNTER — PATIENT OUTREACH (OUTPATIENT)
Dept: CASE MANAGEMENT | Age: 65
End: 2020-05-21

## 2020-05-21 VITALS
OXYGEN SATURATION: 98 % | SYSTOLIC BLOOD PRESSURE: 117 MMHG | WEIGHT: 164 LBS | RESPIRATION RATE: 18 BRPM | HEART RATE: 89 BPM | DIASTOLIC BLOOD PRESSURE: 68 MMHG | TEMPERATURE: 98.7 F | BODY MASS INDEX: 27.29 KG/M2

## 2020-05-21 PROBLEM — R09.02 HYPOXIA: Status: RESOLVED | Noted: 2020-05-18 | Resolved: 2020-05-21

## 2020-05-21 PROCEDURE — 99232 SBSQ HOSP IP/OBS MODERATE 35: CPT | Performed by: INTERNAL MEDICINE

## 2020-05-21 PROCEDURE — 74011250637 HC RX REV CODE- 250/637: Performed by: FAMILY MEDICINE

## 2020-05-21 PROCEDURE — 94761 N-INVAS EAR/PLS OXIMETRY MLT: CPT

## 2020-05-21 PROCEDURE — 77010033678 HC OXYGEN DAILY

## 2020-05-21 PROCEDURE — 71045 X-RAY EXAM CHEST 1 VIEW: CPT

## 2020-05-21 PROCEDURE — 74011250637 HC RX REV CODE- 250/637: Performed by: INTERNAL MEDICINE

## 2020-05-21 RX ORDER — HYDROCODONE BITARTRATE AND ACETAMINOPHEN 5; 325 MG/1; MG/1
1 TABLET ORAL
Qty: 30 TAB | Refills: 0 | Status: SHIPPED | OUTPATIENT
Start: 2020-05-21 | End: 2020-05-26

## 2020-05-21 RX ADMIN — HYDROCODONE BITARTRATE AND ACETAMINOPHEN 1 TABLET: 5; 325 TABLET ORAL at 00:46

## 2020-05-21 RX ADMIN — HYDROCODONE BITARTRATE AND ACETAMINOPHEN 1 TABLET: 5; 325 TABLET ORAL at 04:52

## 2020-05-21 RX ADMIN — HYDROCODONE BITARTRATE AND ACETAMINOPHEN 1 TABLET: 5; 325 TABLET ORAL at 08:59

## 2020-05-21 RX ADMIN — HYDROCODONE BITARTRATE AND ACETAMINOPHEN 1 TABLET: 5; 325 TABLET ORAL at 12:51

## 2020-05-21 RX ADMIN — SENNOSIDES 17.2 MG: 8.6 TABLET, FILM COATED ORAL at 08:54

## 2020-05-21 RX ADMIN — POLYETHYLENE GLYCOL 3350 17 G: 17 POWDER, FOR SOLUTION ORAL at 08:54

## 2020-05-21 RX ADMIN — APIXABAN 5 MG: 5 TABLET, FILM COATED ORAL at 08:54

## 2020-05-21 RX ADMIN — TRIAMTERENE AND HYDROCHLOROTHIAZIDE 1 TABLET: 37.5; 25 TABLET ORAL at 08:54

## 2020-05-21 RX ADMIN — LEVOTHYROXINE SODIUM 88 MCG: 88 TABLET ORAL at 07:26

## 2020-05-21 RX ADMIN — Medication 5 ML: at 05:00

## 2020-05-21 NOTE — PROGRESS NOTES
Received pt from JUAN F Rodriguez) in stable condition. Pt in bed resting quietly. Resp even & unlabored on room air; no acute signs of distress noted. Bed low & locked; call light in reach; no needs voiced.

## 2020-05-21 NOTE — PROGRESS NOTES
Patient having right flank pain with a pain level of 9 and norco 5/325 given at this time. Will assess patient pain level.

## 2020-05-21 NOTE — DISCHARGE SUMMARY
Hospitalist Discharge Summary     Admit Date:  2020  9:56 PM   Name:  Marin Mcfadden   Age:  71 y.o.  :  1955   MRN:  [id number]   PCP:  Isak Hinton MD  Treatment Team: Attending Provider: Rashad Mann MD; Consulting Provider: Darek Nguyen MD; Care Manager: Miah Gabriel.; Utilization Review: Rubia De Jesus; Physical Therapist: Jeanine Devi PT    Problem List for this Hospitalization:  Hospital Problems as of 2020 Date Reviewed: 2020          Codes Class Noted - Resolved POA    Cancer associated pain (Chronic) ICD-10-CM: G89.3  ICD-9-CM: 338.3  2020 - Present Yes        * (Principal) Malignant pleural effusion (Chronic) ICD-10-CM: J91.0  ICD-9-CM: 511.81  2020 - Present Yes        Malignant neoplasm of lung ([address] (Chronic) ICD-10-CM: C34.90  ICD-9-CM: 162.9  2020 - Present Yes        RESOLVED: Hypoxia ICD-10-CM: R09.02  ICD-9-CM: 799.02  2020 - 2020 Yes            Hospital Course:  Ms. Ashley Truong is a very nice 70 y/o AAF with a h/o hypothyroidism who was recently diagnosed with adenocarcinoma of the lung. She was planned for outpatient PleurX placement but became very SOB on  so she presented to [address] CT neg for PE but showed a large right sided effusion so she was transferred here. Pulm consulted, right sided PleurX drain was placed on . She has been educated on drainage. Bowel regimen started for constipation which she is advised to continue at discharge given her narcotics. Oncology appointment with Dr. Sarah Jacinto to be rescheduled. Otherwise continue all home medications. Hospital course otherwise unremarkable and she is medically stable for discharge. PleurX drainage instructions below. Per Pulmonology:  Interim home health nursing has been arranged to help with PleurX:     - Drain 500 cc daily x 2 weeks then every other day x 2 weeks then as needed.    - Stop drainage with chest pain or air from the chest in the system. Disposition: Home Health Care [facility] [place]  Activity: Activity as tolerated  Diet: DIET REGULAR  DIET NUTRITIONAL SUPPLEMENTS Lunch, Dinner, Breakfast; Magic Cups ( )  Code Status: Full Code    Follow Up Orders:  No orders of the defined types were placed in this encounter. Follow-up Information     Follow up With Specialties Details Why Bryan Hernandez MD Hematology and Oncology, Hematology, Oncology Call Follow up. Lung cancer. Pleural effusion s/p PleurX drain. [id number] [address]            Discharge meds at bottom of this note. Plan was discussed with patient, nursing, CM, . All questions answered. Patient was stable at time of discharge. Given instructions to call a physician or return if any concerns. Discharge summary and encounter summary was sent to PCP electronically via \"Comm Mgt\" link in [employer], if possible. Diagnostic Imaging/Tests:   Xr Chest Sngl V    Result Date: 5/21/2020   Portable view of the chest COMPARISON: Yesterday CLINICAL HISTORY: Follow-up FINDINGS: There are bilateral pleural effusions. There is stable right apical chest tube. There is no pneumothorax. Heart appears mildly enlarged. Mediastinal contour is within normal limits. IMPRESSION: 1. Stable right apical chest tube. No evidence of pneumothorax. 2. Bilateral pleural effusions, appearing similar to prior exam.    Xr Chest Sngl V    Result Date: 5/20/2020  Clinical History: The patient is a 71years year old Female presenting with symptoms of SOB Comparison:  Chest x-ray 5/13/2020 Findings:  Frontal view of the chest was obtained. There are increasing perihilar and bibasilar infiltrates with underlying effusions consistent with worsening CHF/volume overload. A small medial right chest tube is in place with tip at the right lung apex. There is no definite evidence of pneumothorax.   The cardiomediastinal silhouette is enlarged however partially obscured. There are no acute osseous abnormalities. Impression:  1. Worsening CHF/volume overload. 2. Interval placement of right chest tube. CPT code(s) 90653     Xr Chest Pa Lat    Result Date: 5/13/2020  CHEST X-RAY, 2 views. HISTORY:  Cough. Pleural effusion. TECHNIQUE: PA and lateral views. COMPARISON: 18 April 2020. FINDINGS: -Lungs: Bibasilar densities and pleural effusion. -Heart size: is normal. -Costophrenic angles: Blunted. -Pulmonary vasculature: Normal. -Included portion of the upper abdomen: is unremarkable. -Bones: No gross bony lesions. -Other: None. IMPRESSION: Decreased pleural fluid and bibasilar densities. .       Echocardiogram results:  No results found for this visit on 05/18/20. Procedures done this admission:  Procedure(s):  PLEURAL CATHETER INSERTION Contact HH for drainage schedule  ULTRASOUND  PLEURAL CATHETER DRAINAGE    All Micro Results     None          SARS-CoV-2 Lab Results  \"Novel Coronavirus\" Test: No results found for: COV2NT   \"Emergent Disease\" Test: No results found for: EDPR  \"SARS-COV-2\" Test: No results found for: XGCOVT  As of: 8:30 AM on 5/21/2020        Labs: Results:       BMP, Mg, Phos Recent Labs     05/19/20  0539      K 3.5      CO2 28   AGAP 6*   BUN 8   CREA 0.87   CA 9.8   *      CBC Recent Labs     05/19/20  0539   WBC 15.3*   RBC 4.67   HGB 11.3*   HCT 35.5*         LFT No results for input(s): SGOT, ALT, TBIL, AP, TP, ALB, GLOB, AGRAT, GPT in the last 72 hours.    Cardiac Testing No results found for: BNPP, BNP, CPK, RCK1, RCK2, RCK3, RCK4, CKMB, CKNDX, CKND1, TROPT, TROIQ   Coagulation Tests Lab Results   Component Value Date/Time    Prothrombin time 13.5 04/18/2020 02:42 PM    INR 1.0 04/18/2020 02:42 PM      A1c No results found for: HBA1C, HGBE8, [id number]   Lipid Panel No results found for: CHOL, CHOLPOCT, CHOLX, CHLST, CHOLV, 053707, HDL, HDLP, LDL, LDLC, DLDLP, 395969, VLDLC, VLDL, TGLX, TRIGL, TRIGP, TGLPOCT, CH[facility], [facility]   Thyroid Panel Lab Results   Component Value Date/Time    TSH 0.021 (L) 01/21/2020 12:35 PM    T4, Free 1.74 01/21/2020 12:35 PM        Most Recent UA No results found for: COLOR, APPRN, REFSG, JANNETH, PROTU, GLUCU, KETU, BILU, BLDU, UROU, ALMAZ, LEUKU, WBCU, RBCU, UEPI, BACTU, CASTS, UCRY, MUCUS, UCOM     Allergies   Allergen Reactions    Latex Rash    Sulfa (Sulfonamide Antibiotics) Swelling     Facial swelling     Oxycodone Other (comments)     Hallucinations    Morphine Other (comments)     Hallucinations    Symbicort [Budesonide-Formoterol] Other (comments)     Hyper, insomnia, nightmares     Immunization History   Administered Date(s) Administered    Tdap 10/30/2019       All Labs from Last 24 Hrs:  No results found for this or any previous visit (from the past 24 hour(s)). Discharge Exam:  Patient Vitals for the past 24 hrs:   Temp Pulse Resp BP SpO2   05/21/20 0800 98.7 °F (37.1 °C) 93 18 127/77 93 %   05/21/20 0359 98.2 °F (36.8 °C) 93 18 106/54 91 %   05/20/20 2333 98.3 °F (36.8 °C) 90 18 124/63 91 %   05/20/20 1926 98.4 °F (36.9 °C) 92 18 122/71 98 %   05/20/20 1714 98.9 °F (37.2 °C) 88 18 125/68 98 %   05/20/20 1540  92      05/20/20 1535    120/63    05/20/20 1151 98.4 °F (36.9 °C) (!) 105 18 152/66 92 %   05/20/20 0847 98.6 °F (37 °C) (!) 105 16 134/69 93 %     Oxygen Therapy  O2 Sat (%): 93 % (05/21/20 0800)  Pulse via Oximetry: 92 beats per minute (05/19/20 1639)  O2 Device: Nasal cannula (05/21/20 0727)  O2 Flow Rate (L/min): 2 l/min (05/21/20 0727)    Estimated body mass index is 27.29 kg/m² as calculated from the following:    Height as of 5/13/20: 5' 5\" (1.651 m). Weight as of this encounter: 74.4 kg (164 lb).       Intake/Output Summary (Last 24 hours) at 5/21/2020 0830  Last data filed at 5/20/2020 1515  Gross per 24 hour   Intake    Output 500 ml   Net -500 ml       *Note that automatically entered I/Os may not be accurate; dependent on patient compliance with collection and accurate  by assistants. General:    Well nourished. Alert. Eyes:   Normal sclerae. Extraocular movements intact. ENT:  Normocephalic, atraumatic. Moist mucous membranes  CV:   Regular rate and rhythm. No murmur, rub, or gallop. Lungs:  Decreased at both bases but clear otherwise. 2L NC O2. Comfortable, NAD. Right sided PleurX drain. Abdomen: Soft, nontender, nondistended. Extremities: Warm and dry. No cyanosis or edema. Neurologic: CN II-XII grossly intact. No gross focal deficits   Skin:     No rashes or jaundice. Psych:  Normal mood and affect. Current Med List in Hospital:   Current Facility-Administered Medications   Medication Dose Route Frequency    polyethylene glycol (MIRALAX) packet 17 g  17 g Oral DAILY    alum-mag hydroxide-simeth (MYLANTA) oral suspension 30 mL  30 mL Oral Q4H PRN    apixaban (ELIQUIS) tablet 5 mg  5 mg Oral BID    triamterene-hydroCHLOROthiazide (MAXZIDE) 37.5-25 mg per tablet 1 Tab  1 Tab Oral DAILY    HYDROcodone-acetaminophen (NORCO) 5-325 mg per tablet 1 Tab  1 Tab Oral Q4H PRN    senna (SENOKOT) tablet 17.2 mg  2 Tab Oral BID    lip protectant (BLISTEX) ointment 1 Each  1 Each Topical PRN    levothyroxine (SYNTHROID) tablet 88 mcg  88 mcg Oral ACB    sodium chloride (NS) flush 5-40 mL  5-40 mL IntraVENous Q8H    sodium chloride (NS) flush 5-40 mL  5-40 mL IntraVENous PRN    diphenhydrAMINE (BENADRYL) capsule 25 mg  25 mg Oral Q4H PRN    ondansetron (ZOFRAN ODT) tablet 4 mg  4 mg Oral Q4H PRN    bisacodyL (DULCOLAX) tablet 5 mg  5 mg Oral DAILY PRN    lidocaine 4 % patch 1 Patch  1 Patch TransDERmal Q24H       Discharge Info:   Current Discharge Medication List      CONTINUE these medications which have CHANGED    Details   HYDROcodone-acetaminophen (NORCO) 5-325 mg per tablet Take 1 Tab by mouth every four (4) hours as needed for Pain for up to 5 days. Max Daily Amount: 6 Tabs.   Qty: 30 Tab, Refills: 0    Associated Diagnoses: Cancer associated pain         CONTINUE these medications which have NOT CHANGED    Details   apixaban (Eliquis) 5 mg tablet Take 1 Tab by mouth two (2) times a day. Qty: 60 Tab, Refills: 0    Associated Diagnoses: Malignant neoplasm of hilus of lung, unspecified laterality (HCC)      lidocaine (LIDODERM) 5 % 1 Patch by TransDERmal route every twelve (12) hours for 30 days. Apply patch to the affected area for 12 hours a day and remove for 12 hours a day. Qty: 1 Each, Refills: 1      alectinib 150 mg cap Take 4 Caps by mouth two (2) times a day. Indications: ALK positive non-small cell lung cancer  Qty: 320 Cap, Refills: 3      acetaminophen (Tylenol Extra Strength) 500 mg tablet Take  by mouth every six (6) hours as needed for Pain. Dyazide 37.5-25 mg per capsule Take 1 Cap by mouth daily. Synthroid 88 mcg tablet Take 1 Tab by mouth Daily (before breakfast). -Take 1 tab once a day on Mon-Thur  -Take 1/2 tab on Fri  -Skip on Sat & Sun           Per Pulmonology:  Interim home health nursing has been arranged to help with pleur-x:    Drain 500 cc daily x 2 weeks then every other day x 2 weeks then as needed. Stop drainage with chest pain or air from the chest in the system. Time spent in patient discharge planning and coordination 35 minutes.     Signed:  Monster Medel MD

## 2020-05-21 NOTE — PROGRESS NOTES
Pt sitting in chair on 2lpm NC Sa02 93% Hr 101  Pt walking out side of room air down hallway Sa02 86%, , pt appears sob  Back to room into chair, back on 2lpm NC patient recovered to 95% .      Recovers and feels better once on 2lpm NC

## 2020-05-21 NOTE — PROGRESS NOTES
Discharge instructions & prescription given to pt. Verbalized understanding. IV removed. Tip intact. Opportunity for questions provided. Pt's family to call when they are in the front of the lobby to  pt.

## 2020-05-21 NOTE — DISCHARGE INSTRUCTIONS
Patient Education     DISCHARGE SUMMARY from Nurse    PATIENT INSTRUCTIONS:    After general anesthesia or intravenous sedation, for 24 hours or while taking prescription Narcotics:  · Limit your activities  · Do not drive and operate hazardous machinery  · Do not make important personal or business decisions  · Do  not drink alcoholic beverages  · If you have not urinated within 8 hours after discharge, please contact your surgeon on call. Report the following to your surgeon:  · Excessive pain, swelling, redness or odor of or around the surgical area  · Temperature over 100.5  · Nausea and vomiting lasting longer than 4 hours or if unable to take medications  · Any signs of decreased circulation or nerve impairment to extremity: change in color, persistent  numbness, tingling, coldness or increase pain  · Any questions    What to do at Home:  Recommended activity: Activity as tolerated. If you experience any of the following symptoms nausea, vomiting, shortness of breath, chest pain; please follow up with MD.    *  Please give a list of your current medications to your Primary Care Provider. *  Please update this list whenever your medications are discontinued, doses are      changed, or new medications (including over-the-counter products) are added. *  Please carry medication information at all times in case of emergency situations. These are general instructions for a healthy lifestyle:    No smoking/ No tobacco products/ Avoid exposure to second hand smoke  Surgeon General's Warning:  Quitting smoking now greatly reduces serious risk to your health.     Obesity, smoking, and sedentary lifestyle greatly increases your risk for illness    A healthy diet, regular physical exercise & weight monitoring are important for maintaining a healthy lifestyle    You may be retaining fluid if you have a history of heart failure or if you experience any of the following symptoms:  Weight gain of 3 pounds or more overnight or 5 pounds in a week, increased swelling in our hands or feet or shortness of breath while lying flat in bed. Please call your doctor as soon as you notice any of these symptoms; do not wait until your next office visit. The discharge information has been reviewed with the patient. The patient verbalized understanding. Discharge medications reviewed with the patient and appropriate educational materials and side effects teaching were provided. ___________________________________________________________________________________________________________________________________     Indwelling Catheter Drainage for Chest or Abdomen: Care Instructions  Your Care Instructions    An indwelling chest or belly (abdominal) catheter is a soft, flexible tube that runs under your skin to an area next to your lungs or in your belly. One end of the tube stays outside your body. When fluid builds up around your lung (pleural effusion), it can cause discomfort and make it hard for you to breathe. Other symptoms can include fever or cough. When fluid collects in the space between the organs in your belly and the belly wall (ascites), you may feel bloated or overly full. Other symptoms of ascites (say \"uh-SY-teez\") include belly pain and weight gain. Draining the fluid through a tube helps relieve these symptoms. \"Indwelling\" means the tube stays in place for as long as you need it. You don't have to get a new catheter put in every time fluid builds up. You can drain the fluid at home--or have someone else do it--whenever you need to. The doctor may use stitches to hold the catheter in place where it exits your body. The site may be sore for a day or two. Follow-up care is a key part of your treatment and safety. Be sure to make and go to all appointments, and call your doctor if you are having problems. It's also a good idea to know your test results and keep a list of the medicines you take.   How can you care for yourself at home? · Follow the instructions for taking care of your catheter. Your doctor or nurse will teach you or your caregivers what to do. How to drain fluid  1. Wash your hands well. Clean your work area with a disinfecting wipe or alcohol. Place everything you will need on your work area, including the drain tube and the bag or bottle that will collect the fluid. 2. Wear disposable gloves if they are part of your kit or if your doctor told you to.  3. Make sure the drainage tube is closed. It may have a clip on it. 4. Remove the cap at the end of your catheter. Wipe the end of the catheter and the end of the drain tube with alcohol. 5. Attach the catheter to the drain tube. 6. Make sure the bottle or bag is at least an arm's length below your chest or belly. If your system is vacuum-sealed or uses a pump, you may not need to lower the container. 7. Depending on what type of drainage kit you have, push the plunger or squeeze the pump to start the drainage. Some kits include a clamp you need to release before the drainage starts. 8. Your doctor will tell you how much fluid to drain at one time. In general, don't drain more than 1,000 milliliters (mL) from your chest or more than 2,000 ml from your belly in a 24-hour period. 9. If you feel pain during drainage, you may be able to ease the pain by slowing down the drainage rate. Do this by raising the bag or bottle. Some drainage kits include a clamp you can pinch to slow the drainage. How to empty the drainage bag or bottle  1. Disconnect the drain tube from your catheter. Let all the fluid in the drain tube flow into the bag or bottle. 2. Wipe the end of your catheter with alcohol. Put a new valve cap on the catheter, and make sure it's tight. 3. Tape the end of the catheter to your skin wherever it's most comfortable. 4. Empty the bag or bottle into the toilet or sink.   5. If you spill some fluid on clothes or skin, clean it up with soap and warm water. You may want to use bleach for spills on some surfaces. 6. Keep a drainage record sheet so that your doctor can see how much fluid is coming from the tube. Your doctor or nurse will give you a record sheet. Or you can just write down the date, time, amount, and color of the drainage. Save the record sheet to show to your doctor. How to change the bandage (dressing)  1. Follow your doctor's instructions for how often to change the bandage. 2. Carefully remove the bandage. 3. Check your skin for signs of infection. 4. Clean the skin with an alcohol pad.  5. Open a new dressing kit. 6. Put the first layer of gauze or foam on the skin, under the catheter. Put the second layer on top of that. Put the clear sticky bandage over everything. When should you call for help? Call  911 anytime you think you may need emergency care. For example, call if:    · You passed out (lost consciousness).     · You have severe trouble breathing.    Call your doctor now or seek immediate medical care if:    · You have new or worse trouble breathing.     · You have symptoms of infection, such as:  ? Increased pain, swelling, warmth, or redness. ? Red streaks leading from the area. ? Pus draining from the area. ? A fever.     · You have new or worse pain.     · The fluid looks different from what you normally see.     · The tube comes out of your body.    Watch closely for changes in your health, and be sure to contact your doctor if:    · There is little or no fluid draining from the catheter.     · You do not get better as expected. Where can you learn more? Go to http://evette-diane.info/  Enter R458 in the search box to learn more about \"Indwelling Catheter Drainage for Chest or Abdomen: Care Instructions. \"  Current as of: June 9, 2019Content Version: 12.4  © 5920-4159 [employer].   Care instructions adapted under license by [employer] (which disclaims liability or warranty for this information). If you have questions about a medical condition or this instruction, always ask your healthcare professional. [place] [address] any warranty or liability for your use of this information.

## 2020-05-21 NOTE — PROGRESS NOTES
[facility]  Admission Date: 5/18/2020             Daily Progress Note: 5/21/2020    The patient's chart is reviewed and the patient is discussed with the staff. Bin Ye is a 68yoF who was admitted to [facility] with advanced stage lung cancer with malignant effusion. She underwent pleur-x catheter placement on 5/19/20 with instructions to drain 500ml daily. Subjective:     On 2lpm of oxygen this morning with sat 91%.     Current Facility-Administered Medications   Medication Dose Route Frequency    polyethylene glycol (MIRALAX) packet 17 g  17 g Oral DAILY    alum-mag hydroxide-simeth (MYLANTA) oral suspension 30 mL  30 mL Oral Q4H PRN    apixaban (ELIQUIS) tablet 5 mg  5 mg Oral BID    triamterene-hydroCHLOROthiazide (MAXZIDE) 37.5-25 mg per tablet 1 Tab  1 Tab Oral DAILY    HYDROcodone-acetaminophen (NORCO) 5-325 mg per tablet 1 Tab  1 Tab Oral Q4H PRN    senna (SENOKOT) tablet 17.2 mg  2 Tab Oral BID    lip protectant (BLISTEX) ointment 1 Each  1 Each Topical PRN    levothyroxine (SYNTHROID) tablet 88 mcg  88 mcg Oral ACB    sodium chloride (NS) flush 5-40 mL  5-40 mL IntraVENous Q8H    sodium chloride (NS) flush 5-40 mL  5-40 mL IntraVENous PRN    diphenhydrAMINE (BENADRYL) capsule 25 mg  25 mg Oral Q4H PRN    ondansetron (ZOFRAN ODT) tablet 4 mg  4 mg Oral Q4H PRN    bisacodyL (DULCOLAX) tablet 5 mg  5 mg Oral DAILY PRN    lidocaine 4 % patch 1 Patch  1 Patch TransDERmal Q24H       Review of Systems  Constitutional: negative for fever, chills, sweats  Cardiovascular: negative for chest pain, palpitations, syncope, edema  Gastrointestinal:  negative for dysphagia, reflux, vomiting, diarrhea, abdominal pain, or melena  Neurologic:  negative for focal weakness, numbness, headache    Objective:     Vitals:    05/20/20 1714 05/20/20 1926 05/20/20 2333 05/21/20 0359   BP: 125/68 122/71 124/63 106/54   Pulse: 88 92 90 93   Resp: 18 18 18 18   Temp: 98.9 °F (37.2 °C) 98.4 °F (36.9 °C) 98.3 °F (36.8 °C) 98.2 °F (36.8 °C)   SpO2: 98% 98% 91% 91%   Weight:             Intake/Output Summary (Last 24 hours) at 5/21/2020 0805  Last data filed at 5/20/2020 1515  Gross per 24 hour   Intake    Output 500 ml   Net -500 ml       Physical Exam:   Constitution:  the patient is well developed and in no acute distress  EENMT:  Sclera clear, pupils equal, oral mucosa moist  Respiratory: few bibasilar crackles. Pleur--x site c/d/i  Cardiovascular:  RRR without M,G,R  Gastrointestinal: soft and non-tender; with positive bowel sounds. Musculoskeletal: warm without cyanosis. There is no lower extremity edema. Skin:  no jaundice or rashes, no wounds   Neurologic: no gross neuro deficits     Psychiatric:  alert and oriented x 3      LAB  No results for input(s): GLUCPOC in the last 72 hours. No lab exists for component: GLPOC   Recent Labs     05/19/20  0539   WBC 15.3*   HGB 11.3*   HCT 35.5*        Recent Labs     05/19/20  0539      K 3.5      CO2 28   *   BUN 8   CREA 0.87   CA 9.8     No results for input(s): PH, PCO2, PO2, HCO3, PHI, PCO2I, PO2I, HCO3I in the last 72 hours. No results for input(s): LCAD, LAC in the last 72 hours. Assessment:  (Medical Decision Making)     Hospital Problems  Date Reviewed: 5/19/2020          Codes Class Noted POA    Hypoxia ICD-10-CM: R09.02  ICD-9-CM: 799.02  5/18/2020 Yes        Cancer associated pain (Chronic) ICD-10-CM: G89.3  ICD-9-CM: 338.3  5/13/2020 Yes        * (Principal) Malignant pleural effusion (Chronic) ICD-10-CM: J91.0  ICD-9-CM: 511.81  4/18/2020 Yes        Malignant neoplasm of lung (HCC) (Chronic) ICD-10-CM: C34.90  ICD-9-CM: 162.9  4/18/2020 Yes            Plan:  (Medical Decision Making)     --Interim home health nursing has been arranged to help with pleur-x:    Drain 500 cc daily x 2 weeks then every other day x 2 weeks then as needed.   Stop drainage with chest pain or air from the chest in the system. --continue pain control. --assess oxygen needs for home. --Oncology follow up being coordinated. More than 50% of the time documented was spent in face-to-face contact with the patient and in the care of the patient on the floor/unit where the patient is located.     Danika Trejo MD

## 2020-05-21 NOTE — PROGRESS NOTES
Care Management Interventions  PCP Verified by CM: Yes  Transition of Care Consult (CM Consult): [address]: No  Physical Therapy Consult: Yes  Occupational Therapy Consult: No  Speech Therapy Consult: No  Current Support Network: Lives with Spouse  Confirm Follow Up Transport: Family  The Plan for Transition of Care is Related to the Following Treatment Goals : patient was ordered [facility] RN for care of pleurax drain  The Patient and/or Patient Representative was Provided with a Choice of Provider and Agrees with the Discharge Plan?: Yes  Name of the Patient Representative Who was Provided with a Choice of Provider and Agrees with the Discharge Plan: siria love  Freedom of Choice List was Provided with Basic Dialogue that Supports the Patient's Individualized Plan of Care/Goals, Treatment Preferences and Shares the Quality Data Associated with the Providers?: Yes  Independence Resource Information Provided?: No  Discharge Location  Discharge Placement: Home with home health  Patient is discharging home with [facility] RN. CM faxed order for home 02 to Ishan Ivey will also send an order to [facility] to assess for 02 conserving device.

## 2020-05-21 NOTE — PROGRESS NOTES
Pleural catheter drained using sterile technique. Site c/d. Sutures in place. 210 mL of serosanguineous fluid; brownish in color drained. Pt tolerated well. No acute signs of distress noted. No major c/o pain noted.

## 2020-05-21 NOTE — ROUTINE PROCESS
Bedside and Verbal shift change report given to Avril Ernst RN and Nia Odonnell RN  (oncoming nurse) by Sarah Hansen RN (offgoing nurse). Report included the following information SBAR, Kardex, MAR and Recent Results.

## 2020-05-22 ENCOUNTER — PATIENT OUTREACH (OUTPATIENT)
Dept: CASE MANAGEMENT | Age: 65
End: 2020-05-22

## 2020-05-22 NOTE — PROGRESS NOTES
1st Attempt to contact patient for Covid-19 At Risk Education call, no answer, left message on voicemail to please return my call. Will attempt to contact patient again within 4 days, due to weekend.

## 2020-05-29 ENCOUNTER — HOSPITAL ENCOUNTER (OUTPATIENT)
Dept: LAB | Age: 65
Discharge: HOME | End: 2020-05-29
Payer: MEDICARE

## 2020-05-29 DIAGNOSIS — C34.00 MALIGNANT NEOPLASM OF HILUS OF LUNG, UNSPECIFIED LATERALITY (HCC): ICD-10-CM

## 2020-05-29 LAB
ALBUMIN SERPL-MCNC: 3.4 G/DL (ref 3.2–4.6)
ALBUMIN/GLOB SERPL: 0.9 {RATIO} (ref 1.2–3.5)
ALP SERPL-CCNC: 407 U/L (ref 50–136)
ALT SERPL-CCNC: 29 U/L (ref 12–65)
ANION GAP SERPL CALC-SCNC: 6 MMOL/L (ref 7–16)
AST SERPL-CCNC: 26 U/L (ref 15–37)
BASOPHILS # BLD: 0.1 K/UL (ref 0–0.2)
BASOPHILS NFR BLD: 1 % (ref 0–2)
BILIRUB SERPL-MCNC: 0.5 MG/DL (ref 0.2–1.1)
BUN SERPL-MCNC: 8 MG/DL (ref 8–23)
CALCIUM SERPL-MCNC: 9.3 MG/DL (ref 8.3–10.4)
CHLORIDE SERPL-SCNC: 100 MMOL/L (ref 98–107)
CO2 SERPL-SCNC: 30 MMOL/L (ref 21–32)
CREAT SERPL-MCNC: 1.1 MG/DL (ref 0.6–1)
DIFFERENTIAL METHOD BLD: ABNORMAL
EOSINOPHIL # BLD: 0.2 K/UL (ref 0–0.8)
EOSINOPHIL NFR BLD: 2 % (ref 0.5–7.8)
ERYTHROCYTE [DISTWIDTH] IN BLOOD BY AUTOMATED COUNT: 17 % (ref 11.9–14.6)
GLOBULIN SER CALC-MCNC: 3.6 G/DL (ref 2.3–3.5)
GLUCOSE SERPL-MCNC: 113 MG/DL (ref 65–100)
HCT VFR BLD AUTO: 33.8 % (ref 35.8–46.3)
HGB BLD-MCNC: 11.1 G/DL (ref 11.7–15.4)
IMM GRANULOCYTES # BLD AUTO: 0 K/UL (ref 0–0.5)
IMM GRANULOCYTES NFR BLD AUTO: 0 % (ref 0–5)
LYMPHOCYTES # BLD: 3.2 K/UL (ref 0.5–4.6)
LYMPHOCYTES NFR BLD: 30 % (ref 13–44)
MCH RBC QN AUTO: 23.6 PG (ref 26.1–32.9)
MCHC RBC AUTO-ENTMCNC: 32.8 G/DL (ref 31.4–35)
MCV RBC AUTO: 71.9 FL (ref 79.6–97.8)
MONOCYTES # BLD: 0.6 K/UL (ref 0.1–1.3)
MONOCYTES NFR BLD: 6 % (ref 4–12)
NEUTS SEG # BLD: 6.6 K/UL (ref 1.7–8.2)
NEUTS SEG NFR BLD: 62 % (ref 43–78)
NRBC # BLD: 0 K/UL (ref 0–0.2)
PLATELET # BLD AUTO: 583 K/UL (ref 150–450)
PMV BLD AUTO: 9.5 FL (ref 9.4–12.3)
POTASSIUM SERPL-SCNC: 3.1 MMOL/L (ref 3.5–5.1)
PROT SERPL-MCNC: 7 G/DL (ref 6.3–8.2)
RBC # BLD AUTO: 4.7 M/UL (ref 4.05–5.25)
SODIUM SERPL-SCNC: 136 MMOL/L (ref 136–145)
WBC # BLD AUTO: 10.7 K/UL (ref 4.3–11.1)

## 2020-05-29 PROCEDURE — 85025 COMPLETE CBC W/AUTO DIFF WBC: CPT

## 2020-05-29 PROCEDURE — 80053 COMPREHEN METABOLIC PANEL: CPT

## 2020-05-29 PROCEDURE — 36415 COLL VENOUS BLD VENIPUNCTURE: CPT

## 2020-06-02 ENCOUNTER — PATIENT OUTREACH (OUTPATIENT)
Dept: CASE MANAGEMENT | Age: 65
End: 2020-06-02

## 2020-06-02 NOTE — PROGRESS NOTES
This note will not be viewable in [address] Patient DC 05/21/2020 one unsuccessful outreach attempt on 05/22/2020. Due to length of time since DC no further outreaches will be completed at this time. Patient is followed by both Oncology and Palliative Care with appointments completed since DC.  Episode resolved and Care Coordinator removed from Care Team.

## 2020-06-05 PROBLEM — R73.9 HYPERGLYCEMIA: Status: ACTIVE | Noted: 2020-06-05

## 2020-06-26 ENCOUNTER — PATIENT OUTREACH (OUTPATIENT)
Dept: CASE MANAGEMENT | Age: 65
End: 2020-06-26

## 2020-06-26 ENCOUNTER — HOSPITAL ENCOUNTER (OUTPATIENT)
Dept: LAB | Age: 65
Discharge: HOME | End: 2020-06-26
Payer: MEDICARE

## 2020-06-26 DIAGNOSIS — C34.00 MALIGNANT NEOPLASM OF HILUS OF LUNG, UNSPECIFIED LATERALITY (HCC): Chronic | ICD-10-CM

## 2020-06-26 LAB
ALBUMIN SERPL-MCNC: 3.3 G/DL (ref 3.2–4.6)
ALBUMIN/GLOB SERPL: 0.9 {RATIO} (ref 1.2–3.5)
ALP SERPL-CCNC: 403 U/L (ref 50–136)
ALT SERPL-CCNC: 27 U/L (ref 12–65)
ANION GAP SERPL CALC-SCNC: 3 MMOL/L (ref 7–16)
AST SERPL-CCNC: 31 U/L (ref 15–37)
BASOPHILS # BLD: 0.1 K/UL (ref 0–0.2)
BASOPHILS NFR BLD: 1 % (ref 0–2)
BILIRUB SERPL-MCNC: 1.5 MG/DL (ref 0.2–1.1)
BUN SERPL-MCNC: 11 MG/DL (ref 8–23)
CALCIUM SERPL-MCNC: 8.7 MG/DL (ref 8.3–10.4)
CHLORIDE SERPL-SCNC: 105 MMOL/L (ref 98–107)
CO2 SERPL-SCNC: 30 MMOL/L (ref 21–32)
CREAT SERPL-MCNC: 1.3 MG/DL (ref 0.6–1)
DIFFERENTIAL METHOD BLD: ABNORMAL
EOSINOPHIL # BLD: 0.3 K/UL (ref 0–0.8)
EOSINOPHIL NFR BLD: 3 % (ref 0.5–7.8)
ERYTHROCYTE [DISTWIDTH] IN BLOOD BY AUTOMATED COUNT: 21.9 % (ref 11.9–14.6)
GLOBULIN SER CALC-MCNC: 3.5 G/DL (ref 2.3–3.5)
GLUCOSE SERPL-MCNC: 99 MG/DL (ref 65–100)
HCT VFR BLD AUTO: 28.5 % (ref 35.8–46.3)
HGB BLD-MCNC: 9.2 G/DL (ref 11.7–15.4)
IMM GRANULOCYTES # BLD AUTO: 0 K/UL (ref 0–0.5)
IMM GRANULOCYTES NFR BLD AUTO: 0 % (ref 0–5)
LYMPHOCYTES # BLD: 4.6 K/UL (ref 0.5–4.6)
LYMPHOCYTES NFR BLD: 43 % (ref 13–44)
MCH RBC QN AUTO: 23.7 PG (ref 26.1–32.9)
MCHC RBC AUTO-ENTMCNC: 32.3 G/DL (ref 31.4–35)
MCV RBC AUTO: 73.5 FL (ref 79.6–97.8)
MONOCYTES # BLD: 0.5 K/UL (ref 0.1–1.3)
MONOCYTES NFR BLD: 5 % (ref 4–12)
NEUTS SEG # BLD: 5.3 K/UL (ref 1.7–8.2)
NEUTS SEG NFR BLD: 48 % (ref 43–78)
NRBC # BLD: 0 K/UL (ref 0–0.2)
PLATELET # BLD AUTO: 458 K/UL (ref 150–450)
PLATELET COMMENTS,PCOM: SLIGHT
PMV BLD AUTO: 10.9 FL (ref 9.4–12.3)
POTASSIUM SERPL-SCNC: 3.3 MMOL/L (ref 3.5–5.1)
PROT SERPL-MCNC: 6.8 G/DL (ref 6.3–8.2)
RBC # BLD AUTO: 3.88 M/UL (ref 4.05–5.25)
RBC MORPH BLD: ABNORMAL
SODIUM SERPL-SCNC: 138 MMOL/L (ref 136–145)
WBC # BLD AUTO: 10.8 K/UL (ref 4.3–11.1)
WBC MORPH BLD: ABNORMAL

## 2020-06-26 PROCEDURE — 80053 COMPREHEN METABOLIC PANEL: CPT

## 2020-06-26 PROCEDURE — 36415 COLL VENOUS BLD VENIPUNCTURE: CPT

## 2020-06-26 PROCEDURE — 85025 COMPLETE CBC W/AUTO DIFF WBC: CPT

## 2020-06-26 NOTE — PROGRESS NOTES
6/26/2020 Saw the patient with Hussein Weaver NP. She is taking alectinib. She is doing well overall. Will continue to follow.

## 2020-07-07 ENCOUNTER — TELEPHONE (OUTPATIENT)
Dept: RADIATION ONCOLOGY | Age: 65
End: 2020-07-07

## 2020-07-08 ENCOUNTER — PATIENT OUTREACH (OUTPATIENT)
Dept: CASE MANAGEMENT | Age: 65
End: 2020-07-08

## 2020-07-08 NOTE — PROGRESS NOTES
7/8/2020 Saw the patient with Yvette Hernandez NP. She is here due to pain in her back that is throbbing and painful. The majority of the pain is across her shoulder blades. We will obtain an MRI of her cervical spine. I did let her know I faxed an order over to [facility] yesterday to continue her home care and help with Pleurx drain. Will continue to follow.

## 2020-07-09 ENCOUNTER — HOSPITAL ENCOUNTER (OUTPATIENT)
Dept: MRI IMAGING | Age: 65
Discharge: HOME | End: 2020-07-09
Attending: NURSE PRACTITIONER

## 2020-07-09 ENCOUNTER — HOSPITAL ENCOUNTER (EMERGENCY)
Age: 65
Discharge: HOME | End: 2020-07-09
Attending: EMERGENCY MEDICINE
Payer: MEDICARE

## 2020-07-09 VITALS
HEART RATE: 75 BPM | HEIGHT: 65 IN | SYSTOLIC BLOOD PRESSURE: 108 MMHG | TEMPERATURE: 97.5 F | OXYGEN SATURATION: 99 % | BODY MASS INDEX: 25.31 KG/M2 | RESPIRATION RATE: 18 BRPM | WEIGHT: 151.9 LBS | DIASTOLIC BLOOD PRESSURE: 74 MMHG

## 2020-07-09 DIAGNOSIS — R55 VASOVAGAL EPISODE: Primary | ICD-10-CM

## 2020-07-09 DIAGNOSIS — C34.00 MALIGNANT NEOPLASM OF HILUS OF LUNG, UNSPECIFIED LATERALITY (HCC): Chronic | ICD-10-CM

## 2020-07-09 PROCEDURE — 99282 EMERGENCY DEPT VISIT SF MDM: CPT

## 2020-07-09 RX ORDER — SODIUM CHLORIDE 0.9 % (FLUSH) 0.9 %
10 SYRINGE (ML) INJECTION
Status: DISCONTINUED | OUTPATIENT
Start: 2020-07-09 | End: 2020-07-10 | Stop reason: HOSPADM

## 2020-07-09 RX ORDER — GADOTERATE MEGLUMINE 376.9 MG/ML
14 INJECTION INTRAVENOUS
Status: DISCONTINUED | OUTPATIENT
Start: 2020-07-09 | End: 2020-07-10 | Stop reason: HOSPADM

## 2020-07-09 NOTE — DISCHARGE INSTRUCTIONS
This sounds like he had a vasovagal episode in the setting of an IV and injection. If you were to have a resumption of the symptoms not in that circumstance you may need further work-up.

## 2020-07-09 NOTE — ED TRIAGE NOTES
Pt to triage via wheelchair wearing mask and o2. Pt states she was out longer today than what is normal for her. Pt was in MRI and had a near syncopal episode. States shes feeling much better now, that's she thinks she \"just overdid it today\" and is requesting to go home and rest. Pt being treated with oral chemo. Spoke with patient about waiting to see the dr and is agreeable at this time.

## 2020-07-09 NOTE — ED PROVIDER NOTES
Pleasant 79-year-old female presenting for an episode of near syncope while getting an IV and receiving an injection during an MRI. States this is happened to her once before. Dizziness   This is a new problem. The current episode started 1 to 2 hours ago. The problem has been resolved. There was no focality noted. There has been no fever.         Past Medical History:   Diagnosis Date    Adverse effect of anesthesia     slow to wake up    Benign essential hypertension 2020    Diagnosed in her 29's    Complete hearing loss of right ear 2020    1982, s/p trauma w/ resultant surgery ending in complete hearing loss    Lymphadenopathy of right cervical region     Malignant neoplasm of lung ([facility] [address]     Postoperative hypothyroidism 2020    S/p total thyroidectomy  for nodular/cystic goiter, benign    S/P total thyroidectomy 2020    Surgery 2015 for Multinodular Goiter, Cystic masses, Benign       Past Surgical History:   Procedure Laterality Date    HX BREAST BIOPSY Left     Benign Cyst Removed    HX  SECTION  ,     HX COLONOSCOPY      Normal done in [address] [place] [address], repeat in 5 yrs due to family hx    HX CYST REMOVAL      Skin on back of neck    HX HEENT      Right Ear Surgery, Traumatic Scars    HX PARTIAL HYSTERECTOMY  in her 42's    Uterine Fibroids, Left Ovarian Cysts-Left Oopherectomy    HX THYROIDECTOMY      Cystic Goiter    IR FINE NEEDLE ASPIRATION THYROID  2013    Benign Cysts         Family History:   Problem Relation Age of Onset    Lung Cancer Mother          age 68, former smoker    Breast Cancer Mother         diagnosed w/ breast cancer in her late 50's/early 63's, surgery, survivor     Hypertension Mother     Prostate Cancer Father          in his early [de-identified] Hypertension Sister     Diabetes Sister     Breast Cancer Sister         diagnosed in her early 42's, treated w/ radiation and surgery    Prostate Cancer Brother         survivor    Colon Cancer Brother         diagnosed in his 52's    Asthma Son     No Known Problems Daughter     Hypertension Sister     High Cholesterol Sister     Hypertension Brother     Obesity Brother     No Known Problems Brother     Heart Disease Brother        Social History     Socioeconomic History    Marital status:      Spouse name: Not on file    Number of children: 2    Years of education: Not on file    Highest education level: Not on file   Occupational History    Occupation: Contractor/ for the [employer]   Social Needs    Financial resource strain: Not on file    Food insecurity     Worry: Not on file     Inability: Not on file   [employer] needs     Medical: Not on file     Non-medical: Not on file   Tobacco Use    Smoking status: Never Smoker    Smokeless tobacco: Never Used   Substance and Sexual Activity    Alcohol use: Not Currently    Drug use: Never    Sexual activity: Yes   Lifestyle    Physical activity     Days per week: Not on file     Minutes per session: Not on file    Stress: Not on file   Relationships    Social connections     Talks on phone: Not on file     Gets together: Not on file     Attends Holiness service: Not on file     Active member of club or organization: Not on file     Attends meetings of clubs or organizations: Not on file     Relationship status: Not on file    Intimate partner violence     Fear of current or ex partner: Not on file     Emotionally abused: Not on file     Physically abused: Not on file     Forced sexual activity: Not on file   Other Topics Concern    Not on file   Social History Narrative    Native of [place] where all of her family still resides. 1 son in [place], but 1 daughter in [address] [place] [address] [facility]. 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     Lives in [facility]. ALLERGIES: Latex; Sulfa (sulfonamide antibiotics); Oxycodone; Morphine; and Symbicort [budesonide-formoterol]    Review of Systems   Neurological: Positive for dizziness and light-headedness. Vitals:    07/09/20 1841   BP: 108/74   Pulse: 75   Resp: 18   Temp: 97.5 °F (36.4 °C)   SpO2: 99%   Weight: 68.9 kg (151 lb 14.4 oz)   Height: 5' 5\" (1.651 m)            Physical Exam  Vitals signs and nursing note reviewed. Constitutional:       Appearance: She is well-developed. HENT:      Head: Normocephalic and atraumatic. Eyes:      Conjunctiva/sclera: Conjunctivae normal.      Pupils: Pupils are equal, round, and reactive to light. Neck:      Musculoskeletal: Neck supple. Cardiovascular:      Rate and Rhythm: Normal rate and regular rhythm. Pulmonary:      Effort: Pulmonary effort is normal.      Breath sounds: Normal breath sounds. Abdominal:      General: Bowel sounds are normal.      Palpations: Abdomen is soft. Musculoskeletal: Normal range of motion. Skin:     General: Skin is warm and dry. Neurological:      Mental Status: She is alert and oriented to person, place, and time. MDM  Number of Diagnoses or Management Options  Vasovagal episode:   Diagnosis management comments: 48-year-old female presenting for near syncope. She now feels much better and does not want any further work-up. I evaluated the patient and explained that there is no way for us to determine whether this truly was a vasovagal episode and she expressed understanding. She reports that if she were to have this episode again she will return to the emergency department. She wants to be discharged from the ER right now. Given that she is awake and alert and making complete sense. She is answering all questions appropriately.   I see no indication to think that the patient cannot make these decisions for self and she will be discharged home    Risk of Complications, Morbidity, and/or Mortality  Presenting problems: moderate  Diagnostic procedures: minimal  Management options: minimal    Patient Progress  Patient progress: resolved         Procedures

## 2020-07-10 ENCOUNTER — PATIENT OUTREACH (OUTPATIENT)
Dept: CASE MANAGEMENT | Age: 65
End: 2020-07-10

## 2020-07-10 NOTE — PROGRESS NOTES
This note will not be viewable in [address] Patient contacted regarding recent discharge and COVID-19 risk. Discussed COVID-19 related testing which was not done at this time. Test results were not done. Patient informed of results, if available? no    Care Transition Nurse/ Ambulatory Care Manager contacted the patient by telephone to perform post discharge assessment. Verified name and  with patient as identifiers. Patient has following risk factors of: lung, pelvic, neck nodules. CTN/ACM reviewed discharge instructions, medical action plan and red flags related to discharge diagnosis. Reviewed and educated them on any new and changed medications related to discharge diagnosis. Advised obtaining a 90-day supply of all daily and as-needed medications. Education provided regarding infection prevention, and signs and symptoms of COVID-19 and when to seek medical attention with patient who verbalized understanding. Discussed exposure protocols and quarantine from [address] you at higher risk for severe illness  and given an opportunity for questions and concerns. The patient agrees to contact the COVID-19 hotline [phone] or PCP office for questions related to their healthcare. CTN/ACM provided contact information for future reference. From CDC: Are you at higher risk for severe illness?  Wash your hands often.  Avoid close contact (6 feet, which is about two arm lengths) with people who are sick.  Put distance between yourself and other people if COVID-19 is spreading in your community.  Clean and disinfect frequently touched surfaces.  Avoid all cruise travel and non-essential air travel.  Call your healthcare professional if you have concerns about COVID-19 and your underlying condition or if you are sick.     For more information on steps you can take to protect yourself, see CDC's How to Protect Yourself      Patient/family/caregiver given information for GetWell Loop and agrees to enroll no  Patient's preferred e-mail:  N/A  Patient's preferred phone number: N/A  Based on Loop alert triggers, patient will be contacted by nurse care manager for worsening symptoms. Plan for follow-up call in 7-14 days based on severity of symptoms and risk factors.

## 2020-07-24 ENCOUNTER — PATIENT OUTREACH (OUTPATIENT)
Dept: CASE MANAGEMENT | Age: 65
End: 2020-07-24

## 2020-07-24 NOTE — PROGRESS NOTES
7/24/2020 Saw the patient with [facility] via virtual visit. She does state the pain between her shoulder blades is better. She is doing well. Will have labs at [facility] next week. Will continue to follow.

## 2020-07-27 NOTE — PROGRESS NOTES
7/24/2020 Saw the patient via virtual visit with Kala Kelly. She is doing well overall. She will have labs drawn at [address] [place] [address].  Will continue to follow.

## 2020-07-28 ENCOUNTER — PATIENT OUTREACH (OUTPATIENT)
Dept: CASE MANAGEMENT | Age: 65
End: 2020-07-28

## 2020-08-14 ENCOUNTER — PATIENT OUTREACH (OUTPATIENT)
Dept: CASE MANAGEMENT | Age: 65
End: 2020-08-14

## 2020-08-14 ENCOUNTER — HOSPITAL ENCOUNTER (OUTPATIENT)
Dept: LAB | Age: 65
Discharge: HOME | End: 2020-08-14
Payer: MEDICARE

## 2020-08-14 DIAGNOSIS — C34.00 MALIGNANT NEOPLASM OF HILUS OF LUNG, UNSPECIFIED LATERALITY (HCC): Chronic | ICD-10-CM

## 2020-08-14 DIAGNOSIS — D64.9 ANEMIA, UNSPECIFIED TYPE: ICD-10-CM

## 2020-08-14 LAB
ALBUMIN SERPL-MCNC: 3.3 G/DL (ref 3.2–4.6)
ALBUMIN/GLOB SERPL: 0.9 {RATIO} (ref 1.2–3.5)
ALP SERPL-CCNC: 188 U/L (ref 50–136)
ALT SERPL-CCNC: 20 U/L (ref 12–65)
ANION GAP SERPL CALC-SCNC: 3 MMOL/L (ref 7–16)
AST SERPL-CCNC: 26 U/L (ref 15–37)
BASOPHILS # BLD: 0.1 K/UL (ref 0–0.2)
BASOPHILS NFR BLD: 1 % (ref 0–2)
BILIRUB SERPL-MCNC: 1 MG/DL (ref 0.2–1.1)
BUN SERPL-MCNC: 14 MG/DL (ref 8–23)
CALCIUM SERPL-MCNC: 9.1 MG/DL (ref 8.3–10.4)
CHLORIDE SERPL-SCNC: 105 MMOL/L (ref 98–107)
CO2 SERPL-SCNC: 33 MMOL/L (ref 21–32)
CREAT SERPL-MCNC: 1.4 MG/DL (ref 0.6–1)
DIFFERENTIAL METHOD BLD: ABNORMAL
EOSINOPHIL # BLD: 0.2 K/UL (ref 0–0.8)
EOSINOPHIL NFR BLD: 2 % (ref 0.5–7.8)
ERYTHROCYTE [DISTWIDTH] IN BLOOD BY AUTOMATED COUNT: 18.2 % (ref 11.9–14.6)
FERRITIN SERPL-MCNC: 469 NG/ML (ref 8–388)
GLOBULIN SER CALC-MCNC: 3.5 G/DL (ref 2.3–3.5)
GLUCOSE SERPL-MCNC: 110 MG/DL (ref 65–100)
HCT VFR BLD AUTO: 28.6 % (ref 35.8–46.3)
HGB BLD-MCNC: 9.2 G/DL (ref 11.7–15.4)
IMM GRANULOCYTES # BLD AUTO: 0 K/UL (ref 0–0.5)
IMM GRANULOCYTES NFR BLD AUTO: 0 % (ref 0–5)
IRON SATN MFR SERPL: 19 %
IRON SERPL-MCNC: 63 UG/DL (ref 35–150)
LYMPHOCYTES # BLD: 4.5 K/UL (ref 0.5–4.6)
LYMPHOCYTES NFR BLD: 44 % (ref 13–44)
MCH RBC QN AUTO: 24.1 PG (ref 26.1–32.9)
MCHC RBC AUTO-ENTMCNC: 32.2 G/DL (ref 31.4–35)
MCV RBC AUTO: 75.1 FL (ref 79.6–97.8)
MONOCYTES # BLD: 0.6 K/UL (ref 0.1–1.3)
MONOCYTES NFR BLD: 6 % (ref 4–12)
NEUTS SEG # BLD: 4.9 K/UL (ref 1.7–8.2)
NEUTS SEG NFR BLD: 47 % (ref 43–78)
NRBC # BLD: 0 K/UL (ref 0–0.2)
PLATELET # BLD AUTO: 580 K/UL (ref 150–450)
PLATELET COMMENTS,PCOM: ABNORMAL
PMV BLD AUTO: 11.4 FL (ref 9.4–12.3)
POTASSIUM SERPL-SCNC: 3.4 MMOL/L (ref 3.5–5.1)
PROT SERPL-MCNC: 6.8 G/DL (ref 6.3–8.2)
RBC # BLD AUTO: 3.81 M/UL (ref 4.05–5.25)
RBC MORPH BLD: ABNORMAL
SODIUM SERPL-SCNC: 141 MMOL/L (ref 136–145)
TIBC SERPL-MCNC: 328 UG/DL (ref 250–450)
WBC # BLD AUTO: 10.3 K/UL (ref 4.3–11.1)
WBC MORPH BLD: ABNORMAL

## 2020-08-14 PROCEDURE — 82728 ASSAY OF FERRITIN: CPT

## 2020-08-14 PROCEDURE — 83540 ASSAY OF IRON: CPT

## 2020-08-14 PROCEDURE — 36415 COLL VENOUS BLD VENIPUNCTURE: CPT

## 2020-08-14 PROCEDURE — 85025 COMPLETE CBC W/AUTO DIFF WBC: CPT

## 2020-08-14 PROCEDURE — 80053 COMPREHEN METABOLIC PANEL: CPT

## 2020-08-14 NOTE — PROGRESS NOTES
8/14/2020 Saw the patient with Dr Mark Stephens. She continues to drain the pleurex once every 5 days. She got about 350cc off the last time they drained. We will obtain a ct chest/abd/pelvis and MRI of brain prior to next OV. She discussed traveling to [facility] and Dr Mark Stephens discussed with them his preference that she drive vs fly with her pleurex drain. Will continue to follow.

## 2020-09-14 ENCOUNTER — HOSPITAL ENCOUNTER (OUTPATIENT)
Dept: LAB | Age: 65
Discharge: HOME | End: 2020-09-14
Payer: MEDICARE

## 2020-09-14 ENCOUNTER — PATIENT OUTREACH (OUTPATIENT)
Dept: CASE MANAGEMENT | Age: 65
End: 2020-09-14

## 2020-09-14 DIAGNOSIS — C34.90 MALIGNANT NEOPLASM OF LUNG, UNSPECIFIED LATERALITY, UNSPECIFIED PART OF LUNG (HCC): ICD-10-CM

## 2020-09-14 LAB
ALBUMIN SERPL-MCNC: 3.4 G/DL (ref 3.2–4.6)
ALBUMIN/GLOB SERPL: 1 {RATIO} (ref 1.2–3.5)
ALP SERPL-CCNC: 161 U/L (ref 50–136)
ALT SERPL-CCNC: 20 U/L (ref 12–65)
ANION GAP SERPL CALC-SCNC: 2 MMOL/L (ref 7–16)
AST SERPL-CCNC: 23 U/L (ref 15–37)
BASOPHILS # BLD: 0.1 K/UL (ref 0–0.2)
BASOPHILS NFR BLD: 1 % (ref 0–2)
BILIRUB SERPL-MCNC: 1 MG/DL (ref 0.2–1.1)
BUN SERPL-MCNC: 15 MG/DL (ref 8–23)
CALCIUM SERPL-MCNC: 9 MG/DL (ref 8.3–10.4)
CHLORIDE SERPL-SCNC: 106 MMOL/L (ref 98–107)
CO2 SERPL-SCNC: 33 MMOL/L (ref 21–32)
CREAT SERPL-MCNC: 1.6 MG/DL (ref 0.6–1)
DIFFERENTIAL METHOD BLD: ABNORMAL
EOSINOPHIL # BLD: 0.2 K/UL (ref 0–0.8)
EOSINOPHIL NFR BLD: 2 % (ref 0.5–7.8)
ERYTHROCYTE [DISTWIDTH] IN BLOOD BY AUTOMATED COUNT: 17 % (ref 11.9–14.6)
GLOBULIN SER CALC-MCNC: 3.4 G/DL (ref 2.3–3.5)
GLUCOSE SERPL-MCNC: 99 MG/DL (ref 65–100)
HCT VFR BLD AUTO: 29.4 % (ref 35.8–46.3)
HGB BLD-MCNC: 9.3 G/DL (ref 11.7–15.4)
IMM GRANULOCYTES # BLD AUTO: 0 K/UL (ref 0–0.5)
IMM GRANULOCYTES NFR BLD AUTO: 0 % (ref 0–5)
LYMPHOCYTES # BLD: 4.6 K/UL (ref 0.5–4.6)
LYMPHOCYTES NFR BLD: 43 % (ref 13–44)
MCH RBC QN AUTO: 23.7 PG (ref 26.1–32.9)
MCHC RBC AUTO-ENTMCNC: 31.6 G/DL (ref 31.4–35)
MCV RBC AUTO: 75 FL (ref 79.6–97.8)
MONOCYTES # BLD: 0.6 K/UL (ref 0.1–1.3)
MONOCYTES NFR BLD: 6 % (ref 4–12)
NEUTS SEG # BLD: 5.1 K/UL (ref 1.7–8.2)
NEUTS SEG NFR BLD: 48 % (ref 43–78)
NRBC # BLD: 0 K/UL (ref 0–0.2)
PLATELET # BLD AUTO: 531 K/UL (ref 150–450)
PLATELET COMMENTS,PCOM: ABNORMAL
PMV BLD AUTO: 11 FL (ref 9.4–12.3)
POTASSIUM SERPL-SCNC: 3.5 MMOL/L (ref 3.5–5.1)
PROT SERPL-MCNC: 6.8 G/DL (ref 6.3–8.2)
RBC # BLD AUTO: 3.92 M/UL (ref 4.05–5.25)
RBC MORPH BLD: ABNORMAL
SODIUM SERPL-SCNC: 141 MMOL/L (ref 136–145)
WBC # BLD AUTO: 10.6 K/UL (ref 4.3–11.1)
WBC MORPH BLD: ABNORMAL

## 2020-09-14 PROCEDURE — 85025 COMPLETE CBC W/AUTO DIFF WBC: CPT

## 2020-09-14 PROCEDURE — 36415 COLL VENOUS BLD VENIPUNCTURE: CPT

## 2020-09-14 PROCEDURE — 80053 COMPREHEN METABOLIC PANEL: CPT

## 2020-09-14 NOTE — PROGRESS NOTES
2020 Saw the patient with Dr Darlin Green. She continues to get 300 cc every 5 days with her Pleurex. Her recent scans show improvement and she will continue with the alectinib. We will move to every 2 month follow up. The patient's   a couple of weeks ago and she is dealing well with this. She asked Dr Darlin Green about stopping the Eliquis since she has completed 6 months of treatment. Will continue to follow.

## 2020-11-17 ENCOUNTER — HOSPITAL ENCOUNTER (OUTPATIENT)
Dept: LAB | Age: 65
Discharge: HOME | End: 2020-11-17
Payer: MEDICARE

## 2020-11-17 ENCOUNTER — PATIENT OUTREACH (OUTPATIENT)
Dept: CASE MANAGEMENT | Age: 65
End: 2020-11-17

## 2020-11-17 DIAGNOSIS — C34.00 MALIGNANT NEOPLASM OF HILUS OF LUNG, UNSPECIFIED LATERALITY (HCC): Chronic | ICD-10-CM

## 2020-11-17 LAB
ALBUMIN SERPL-MCNC: 3.4 G/DL (ref 3.2–4.6)
ALBUMIN/GLOB SERPL: 0.9 {RATIO} (ref 1.2–3.5)
ALP SERPL-CCNC: 157 U/L (ref 50–136)
ALT SERPL-CCNC: 23 U/L (ref 12–65)
ANION GAP SERPL CALC-SCNC: 4 MMOL/L (ref 7–16)
AST SERPL-CCNC: 26 U/L (ref 15–37)
BASOPHILS # BLD: 0.1 K/UL (ref 0–0.2)
BASOPHILS NFR BLD: 1 % (ref 0–2)
BILIRUB SERPL-MCNC: 1.2 MG/DL (ref 0.2–1.1)
BUN SERPL-MCNC: 15 MG/DL (ref 8–23)
CALCIUM SERPL-MCNC: 9.5 MG/DL (ref 8.3–10.4)
CHLORIDE SERPL-SCNC: 104 MMOL/L (ref 98–107)
CO2 SERPL-SCNC: 33 MMOL/L (ref 21–32)
CREAT SERPL-MCNC: 1.5 MG/DL (ref 0.6–1)
DIFFERENTIAL METHOD BLD: ABNORMAL
EOSINOPHIL # BLD: 0.2 K/UL (ref 0–0.8)
EOSINOPHIL NFR BLD: 2 % (ref 0.5–7.8)
ERYTHROCYTE [DISTWIDTH] IN BLOOD BY AUTOMATED COUNT: 16.8 % (ref 11.9–14.6)
GLOBULIN SER CALC-MCNC: 3.7 G/DL (ref 2.3–3.5)
GLUCOSE SERPL-MCNC: 104 MG/DL (ref 65–100)
HCT VFR BLD AUTO: 32.1 % (ref 35.8–46.3)
HGB BLD-MCNC: 10.2 G/DL (ref 11.7–15.4)
IMM GRANULOCYTES # BLD AUTO: 0 K/UL (ref 0–0.5)
IMM GRANULOCYTES NFR BLD AUTO: 0 % (ref 0–5)
LYMPHOCYTES # BLD: 4.1 K/UL (ref 0.5–4.6)
LYMPHOCYTES NFR BLD: 43 % (ref 13–44)
MCH RBC QN AUTO: 23.3 PG (ref 26.1–32.9)
MCHC RBC AUTO-ENTMCNC: 31.8 G/DL (ref 31.4–35)
MCV RBC AUTO: 73.3 FL (ref 79.6–97.8)
MONOCYTES # BLD: 0.5 K/UL (ref 0.1–1.3)
MONOCYTES NFR BLD: 5 % (ref 4–12)
NEUTS SEG # BLD: 4.7 K/UL (ref 1.7–8.2)
NEUTS SEG NFR BLD: 49 % (ref 43–78)
NRBC # BLD: 0 K/UL (ref 0–0.2)
PLATELET # BLD AUTO: 519 K/UL (ref 150–450)
PLATELET COMMENTS,PCOM: ABNORMAL
PMV BLD AUTO: 10.8 FL (ref 9.4–12.3)
POTASSIUM SERPL-SCNC: 3.1 MMOL/L (ref 3.5–5.1)
PROT SERPL-MCNC: 7.1 G/DL (ref 6.3–8.2)
RBC # BLD AUTO: 4.38 M/UL (ref 4.05–5.25)
RBC MORPH BLD: ABNORMAL
RBC MORPH BLD: ABNORMAL
SODIUM SERPL-SCNC: 141 MMOL/L (ref 136–145)
WBC # BLD AUTO: 9.6 K/UL (ref 4.3–11.1)
WBC MORPH BLD: ABNORMAL

## 2020-11-17 PROCEDURE — 85025 COMPLETE CBC W/AUTO DIFF WBC: CPT

## 2020-11-17 PROCEDURE — 36415 COLL VENOUS BLD VENIPUNCTURE: CPT

## 2020-11-17 PROCEDURE — 80053 COMPREHEN METABOLIC PANEL: CPT

## 2020-11-17 NOTE — PROGRESS NOTES
11/17/2020  Patient seen with Teresa Dumont NP for a follow-up visit. Questions and discPotassium prescription sent and patient instructed to begin taking potassium supplement. Patient wishes to continue on treatment plan and navigation will continue to follow.

## 2020-12-07 ENCOUNTER — PATIENT OUTREACH (OUTPATIENT)
Dept: CASE MANAGEMENT | Age: 65
End: 2020-12-07

## 2020-12-07 NOTE — PROGRESS NOTES
12/7/20 pt called to discuss pleurex draining - 12/2 drained for 400 cc (6 days), then 12/7 drained for 500 cc (5 days). She is concerned amount is increasing. Some slight discomfort on back near drain. Advised to monitor closely and keep us updated after next drain. Last imaging was August of this year. Next follow up scheduled for 1/12 with Dr. Julio César Goldman. She is agreeable with this plan. Navigation will continue to follow.

## 2021-01-12 ENCOUNTER — HOSPITAL ENCOUNTER (OUTPATIENT)
Dept: LAB | Age: 66
Discharge: HOME | End: 2021-01-12
Payer: MEDICARE

## 2021-01-12 ENCOUNTER — PATIENT OUTREACH (OUTPATIENT)
Dept: CASE MANAGEMENT | Age: 66
End: 2021-01-12

## 2021-01-12 DIAGNOSIS — C34.00 MALIGNANT NEOPLASM OF HILUS OF LUNG, UNSPECIFIED LATERALITY (HCC): ICD-10-CM

## 2021-01-12 LAB
ALBUMIN SERPL-MCNC: 3.2 G/DL (ref 3.2–4.6)
ALBUMIN/GLOB SERPL: 0.9 {RATIO} (ref 1.2–3.5)
ALP SERPL-CCNC: 139 U/L (ref 50–136)
ALT SERPL-CCNC: 20 U/L (ref 12–65)
ANION GAP SERPL CALC-SCNC: 5 MMOL/L (ref 7–16)
AST SERPL-CCNC: 20 U/L (ref 15–37)
BASOPHILS # BLD: 0.1 K/UL (ref 0–0.2)
BASOPHILS NFR BLD: 1 % (ref 0–2)
BILIRUB SERPL-MCNC: 0.9 MG/DL (ref 0.2–1.1)
BUN SERPL-MCNC: 22 MG/DL (ref 8–23)
CALCIUM SERPL-MCNC: 9.2 MG/DL (ref 8.3–10.4)
CHLORIDE SERPL-SCNC: 103 MMOL/L (ref 98–107)
CO2 SERPL-SCNC: 32 MMOL/L (ref 21–32)
CREAT SERPL-MCNC: 1.6 MG/DL (ref 0.6–1)
DIFFERENTIAL METHOD BLD: ABNORMAL
EOSINOPHIL # BLD: 0.2 K/UL (ref 0–0.8)
EOSINOPHIL NFR BLD: 2 % (ref 0.5–7.8)
ERYTHROCYTE [DISTWIDTH] IN BLOOD BY AUTOMATED COUNT: 17.2 % (ref 11.9–14.6)
GLOBULIN SER CALC-MCNC: 3.6 G/DL (ref 2.3–3.5)
GLUCOSE SERPL-MCNC: 131 MG/DL (ref 65–100)
HCT VFR BLD AUTO: 30.7 % (ref 35.8–46.3)
HGB BLD-MCNC: 9.7 G/DL (ref 11.7–15.4)
IMM GRANULOCYTES # BLD AUTO: 0.1 K/UL (ref 0–0.5)
IMM GRANULOCYTES NFR BLD AUTO: 1 % (ref 0–5)
LYMPHOCYTES # BLD: 3.8 K/UL (ref 0.5–4.6)
LYMPHOCYTES NFR BLD: 34 % (ref 13–44)
MAGNESIUM SERPL-MCNC: 1.8 MG/DL (ref 1.8–2.4)
MCH RBC QN AUTO: 23.5 PG (ref 26.1–32.9)
MCHC RBC AUTO-ENTMCNC: 31.6 G/DL (ref 31.4–35)
MCV RBC AUTO: 74.5 FL (ref 79.6–97.8)
MONOCYTES # BLD: 0.6 K/UL (ref 0.1–1.3)
MONOCYTES NFR BLD: 5 % (ref 4–12)
NEUTS SEG # BLD: 6.3 K/UL (ref 1.7–8.2)
NEUTS SEG NFR BLD: 57 % (ref 43–78)
NRBC # BLD: 0 K/UL (ref 0–0.2)
PLATELET # BLD AUTO: 516 K/UL (ref 150–450)
PMV BLD AUTO: 12.2 FL (ref 9.4–12.3)
POTASSIUM SERPL-SCNC: 3.3 MMOL/L (ref 3.5–5.1)
PROT SERPL-MCNC: 6.8 G/DL (ref 6.3–8.2)
RBC # BLD AUTO: 4.12 M/UL (ref 4.05–5.25)
SODIUM SERPL-SCNC: 140 MMOL/L (ref 136–145)
WBC # BLD AUTO: 11.1 K/UL (ref 4.3–11.1)

## 2021-01-12 PROCEDURE — 85025 COMPLETE CBC W/AUTO DIFF WBC: CPT

## 2021-01-12 PROCEDURE — 83735 ASSAY OF MAGNESIUM: CPT

## 2021-01-12 PROCEDURE — 36415 COLL VENOUS BLD VENIPUNCTURE: CPT

## 2021-01-12 PROCEDURE — 80053 COMPREHEN METABOLIC PANEL: CPT

## 2021-01-12 NOTE — PROGRESS NOTES
1/12/2021 Saw the patient with Dr Alejandro Townsend. She is doing well overall. She does have some mild lower extremity edema. She is draining her pleurx every 5-6 days with about 350cc, she doesn't notice any difference in her shortness of breath when she waits to drain. She will have imaging done at [facility] prior to return in 8 weeks. Will continue to follow.

## 2021-03-08 ENCOUNTER — HOSPITAL ENCOUNTER (OUTPATIENT)
Dept: LAB | Age: 66
Discharge: HOME | End: 2021-03-08
Payer: MEDICARE

## 2021-03-08 ENCOUNTER — PATIENT OUTREACH (OUTPATIENT)
Dept: CASE MANAGEMENT | Age: 66
End: 2021-03-08

## 2021-03-08 DIAGNOSIS — C34.00 MALIGNANT NEOPLASM OF HILUS OF LUNG, UNSPECIFIED LATERALITY (HCC): Chronic | ICD-10-CM

## 2021-03-08 LAB
ALBUMIN SERPL-MCNC: 3.2 G/DL (ref 3.2–4.6)
ALBUMIN/GLOB SERPL: 0.9 {RATIO} (ref 1.2–3.5)
ALP SERPL-CCNC: 133 U/L (ref 50–136)
ALT SERPL-CCNC: 22 U/L (ref 12–65)
ANION GAP SERPL CALC-SCNC: 2 MMOL/L (ref 7–16)
AST SERPL-CCNC: 24 U/L (ref 15–37)
BASOPHILS # BLD: 0.1 K/UL (ref 0–0.2)
BASOPHILS NFR BLD: 1 % (ref 0–2)
BILIRUB SERPL-MCNC: 1.1 MG/DL (ref 0.2–1.1)
BUN SERPL-MCNC: 20 MG/DL (ref 8–23)
CALCIUM SERPL-MCNC: 9 MG/DL (ref 8.3–10.4)
CHLORIDE SERPL-SCNC: 105 MMOL/L (ref 98–107)
CO2 SERPL-SCNC: 35 MMOL/L (ref 21–32)
CREAT SERPL-MCNC: 1.4 MG/DL (ref 0.6–1)
DIFFERENTIAL METHOD BLD: ABNORMAL
EOSINOPHIL # BLD: 0.7 K/UL (ref 0–0.8)
EOSINOPHIL NFR BLD: 7 % (ref 0.5–7.8)
ERYTHROCYTE [DISTWIDTH] IN BLOOD BY AUTOMATED COUNT: 17.5 % (ref 11.9–14.6)
GLOBULIN SER CALC-MCNC: 3.5 G/DL (ref 2.3–3.5)
GLUCOSE SERPL-MCNC: 100 MG/DL (ref 65–100)
HCT VFR BLD AUTO: 30.8 % (ref 35.8–46.3)
HGB BLD-MCNC: 9.9 G/DL (ref 11.7–15.4)
IMM GRANULOCYTES # BLD AUTO: 0 K/UL (ref 0–0.5)
IMM GRANULOCYTES NFR BLD AUTO: 0 % (ref 0–5)
LYMPHOCYTES # BLD: 3.7 K/UL (ref 0.5–4.6)
LYMPHOCYTES NFR BLD: 36 % (ref 13–44)
MCH RBC QN AUTO: 23.6 PG (ref 26.1–32.9)
MCHC RBC AUTO-ENTMCNC: 32.1 G/DL (ref 31.4–35)
MCV RBC AUTO: 73.5 FL (ref 79.6–97.8)
MONOCYTES # BLD: 0.7 K/UL (ref 0.1–1.3)
MONOCYTES NFR BLD: 6 % (ref 4–12)
NEUTS SEG # BLD: 5.2 K/UL (ref 1.7–8.2)
NEUTS SEG NFR BLD: 50 % (ref 43–78)
NRBC # BLD: 0 K/UL (ref 0–0.2)
PLATELET # BLD AUTO: 476 K/UL (ref 150–450)
PMV BLD AUTO: 11.1 FL (ref 9.4–12.3)
POTASSIUM SERPL-SCNC: 3.4 MMOL/L (ref 3.5–5.1)
PROT SERPL-MCNC: 6.7 G/DL (ref 6.3–8.2)
RBC # BLD AUTO: 4.19 M/UL (ref 4.05–5.25)
SODIUM SERPL-SCNC: 142 MMOL/L (ref 136–145)
WBC # BLD AUTO: 10.4 K/UL (ref 4.3–11.1)

## 2021-03-08 PROCEDURE — 80053 COMPREHEN METABOLIC PANEL: CPT

## 2021-03-08 PROCEDURE — 36415 COLL VENOUS BLD VENIPUNCTURE: CPT

## 2021-03-08 PROCEDURE — 85025 COMPLETE CBC W/AUTO DIFF WBC: CPT

## 2021-03-08 NOTE — PROGRESS NOTES
3/8/2021 Saw the patient with Dr Brooklyn Dunbar. She is taking her alectinib and doing well with this. She had MRI brain and CT chest/abd/pelvis which all show stable disease. She still have pleurx in place and drains this only when uncomfortable which currently was 9 days ago. She is going to [place] from the end of March to August and we have asked her to have labs drawn at [facility] while she is there and printed order was given. She will have repeat imaging in September. Will continue to follow.

## 2021-05-03 ENCOUNTER — PATIENT OUTREACH (OUTPATIENT)
Dept: CASE MANAGEMENT | Age: 66
End: 2021-05-03

## 2021-05-03 NOTE — PROGRESS NOTES
Spoke with patient. She is concerned that she is experiencing chronic fatigue since receiving her Covid vaccine. She is currently on Alectinib and has been tolerating well for over one year. Advised patient to see her PCP since she is currently residing in [place] until September. Encouraged to call with questions or if we can help get records sent for her. Navigation will continue to follow.

## 2021-09-08 NOTE — TELEPHONE ENCOUNTER
Message forwarded to Navigation for assistance.
They need new orders for port in r lung
[FreeTextEntry1] : 46-year-old female complaining of palpating a tender lump right axilla about a week ago around the time of menstruation. There is nothing there now, it resolved in a day or 2. Her mammogram is up to date. Advised patient most likely an enlarged lymph node that resolved, could have been related to her menses or even related to her shaving. Advised careful shaving with cleansing first, fresh razor blade as well as shaving cream. If lump reappears there or anywhere return to office
[FreeTextEntry1] : 46-year-old female complaining of palpating a tender lump right axilla about a week ago around the time of menstruation. There is nothing there now, it resolved in a day or 2. Her mammogram is up to date. Advised patient most likely an enlarged lymph node that resolved, could have been related to her menses or even related to her shaving. Advised careful shaving with cleansing first, fresh razor blade as well as shaving cream. If lump reappears there or anywhere return to office

## 2024-04-19 NOTE — TELEPHONE ENCOUNTER
Med returned call to clinic and was informed of Dr Mcdermott's review and orders as noted. Med verbalizes understanding and is appreciative of call.     Patient is calling requesting a call back with X-ray results. Pt also would like the mammogram results.            Best callback:[phone]  LOV: Tuesday, January 21, 2020
